# Patient Record
Sex: MALE | Race: BLACK OR AFRICAN AMERICAN | NOT HISPANIC OR LATINO | URBAN - METROPOLITAN AREA
[De-identification: names, ages, dates, MRNs, and addresses within clinical notes are randomized per-mention and may not be internally consistent; named-entity substitution may affect disease eponyms.]

---

## 2019-08-14 ENCOUNTER — EMERGENCY (EMERGENCY)
Facility: HOSPITAL | Age: 64
LOS: 1 days | End: 2019-08-14
Admitting: EMERGENCY MEDICINE
Payer: MEDICARE

## 2019-08-14 PROBLEM — Z00.00 ENCOUNTER FOR PREVENTIVE HEALTH EXAMINATION: Status: ACTIVE | Noted: 2019-08-14

## 2019-08-14 PROCEDURE — 99283 EMERGENCY DEPT VISIT LOW MDM: CPT

## 2019-08-16 ENCOUNTER — APPOINTMENT (OUTPATIENT)
Dept: RADIOLOGY | Facility: CLINIC | Age: 64
End: 2019-08-16
Payer: MEDICARE

## 2019-08-16 PROCEDURE — 72110 X-RAY EXAM L-2 SPINE 4/>VWS: CPT

## 2021-04-16 ENCOUNTER — APPOINTMENT (OUTPATIENT)
Age: 66
End: 2021-04-16
Payer: MEDICARE

## 2021-04-16 PROCEDURE — 0001A: CPT

## 2021-05-07 ENCOUNTER — APPOINTMENT (OUTPATIENT)
Age: 66
End: 2021-05-07
Payer: MEDICARE

## 2021-05-07 PROCEDURE — 0002A: CPT

## 2021-08-06 ENCOUNTER — RX RENEWAL (OUTPATIENT)
Age: 66
End: 2021-08-06

## 2021-08-13 ENCOUNTER — RX RENEWAL (OUTPATIENT)
Age: 66
End: 2021-08-13

## 2021-08-13 ENCOUNTER — APPOINTMENT (OUTPATIENT)
Dept: FAMILY MEDICINE | Facility: CLINIC | Age: 66
End: 2021-08-13
Payer: MEDICARE

## 2021-08-13 VITALS
BODY MASS INDEX: 44.38 KG/M2 | RESPIRATION RATE: 16 BRPM | HEART RATE: 78 BPM | HEIGHT: 63 IN | SYSTOLIC BLOOD PRESSURE: 140 MMHG | TEMPERATURE: 97.6 F | DIASTOLIC BLOOD PRESSURE: 80 MMHG | WEIGHT: 250.5 LBS | OXYGEN SATURATION: 99 %

## 2021-08-13 PROCEDURE — 99213 OFFICE O/P EST LOW 20 MIN: CPT | Mod: 25

## 2021-08-13 RX ORDER — SITAGLIPTIN 100 MG/1
100 TABLET, FILM COATED ORAL DAILY
Refills: 0 | Status: DISCONTINUED | COMMUNITY
End: 2021-08-13

## 2021-08-13 NOTE — HISTORY OF PRESENT ILLNESS
[FreeTextEntry1] : feeling well\par \par follows with endo.\par recently placed on trulicity\par \par presents for refill  and labs

## 2021-08-16 LAB
ALBUMIN SERPL ELPH-MCNC: 4.9 G/DL
ALP BLD-CCNC: 44 U/L
ALT SERPL-CCNC: 35 U/L
ANION GAP SERPL CALC-SCNC: 15 MMOL/L
AST SERPL-CCNC: 22 U/L
BILIRUB SERPL-MCNC: 0.4 MG/DL
BUN SERPL-MCNC: 27 MG/DL
CALCIUM SERPL-MCNC: 10.3 MG/DL
CHLORIDE SERPL-SCNC: 98 MMOL/L
CHOLEST SERPL-MCNC: 158 MG/DL
CO2 SERPL-SCNC: 20 MMOL/L
CREAT SERPL-MCNC: 0.89 MG/DL
ESTIMATED AVERAGE GLUCOSE: 174 MG/DL
GLUCOSE SERPL-MCNC: 240 MG/DL
HBA1C MFR BLD HPLC: 7.7 %
HDLC SERPL-MCNC: 54 MG/DL
LDLC SERPL CALC-MCNC: 90 MG/DL
NONHDLC SERPL-MCNC: 104 MG/DL
POTASSIUM SERPL-SCNC: 4.9 MMOL/L
PROT SERPL-MCNC: 7.7 G/DL
SODIUM SERPL-SCNC: 133 MMOL/L
TRIGL SERPL-MCNC: 71 MG/DL

## 2021-10-12 ENCOUNTER — RX RENEWAL (OUTPATIENT)
Age: 66
End: 2021-10-12

## 2021-11-04 ENCOUNTER — RX RENEWAL (OUTPATIENT)
Age: 66
End: 2021-11-04

## 2021-11-12 ENCOUNTER — APPOINTMENT (OUTPATIENT)
Dept: FAMILY MEDICINE | Facility: CLINIC | Age: 66
End: 2021-11-12

## 2021-11-19 ENCOUNTER — APPOINTMENT (OUTPATIENT)
Dept: FAMILY MEDICINE | Facility: CLINIC | Age: 66
End: 2021-11-19
Payer: MEDICARE

## 2021-11-19 VITALS
TEMPERATURE: 97.2 F | BODY MASS INDEX: 42.15 KG/M2 | HEIGHT: 65 IN | DIASTOLIC BLOOD PRESSURE: 80 MMHG | OXYGEN SATURATION: 99 % | HEART RATE: 83 BPM | WEIGHT: 253 LBS | SYSTOLIC BLOOD PRESSURE: 138 MMHG

## 2021-11-19 DIAGNOSIS — F10.11 ALCOHOL ABUSE, IN REMISSION: ICD-10-CM

## 2021-11-19 PROCEDURE — G0442 ANNUAL ALCOHOL SCREEN 15 MIN: CPT | Mod: 59

## 2021-11-19 PROCEDURE — 99214 OFFICE O/P EST MOD 30 MIN: CPT | Mod: 25

## 2021-11-19 PROCEDURE — G0443: CPT | Mod: 59

## 2021-11-19 NOTE — HISTORY OF PRESENT ILLNESS
[FreeTextEntry1] : Medication renewal and lab work [de-identified] : 66-year-old gentleman presents to this office for renewal of prescriptions as well as lab work\par He has a history of alcohol abuse and will resume drinking after the loss of his mother\par Complaining of right-sided groin spasm and pain down his right leg

## 2021-11-19 NOTE — PLAN
[FreeTextEntry1] : 66-year-old male presents for medication renewal\par Metabolic syndrome–hypertension hyperlipidemia diabetes mellitus type 2 on medication for control.\par Norvasc 10 mg daily/aspirin 81 mg daily/losartan 100 Sebastian/Metformin 2500 mg daily\par Metoprolol 50 mg every 12 hours\par Pioglitazone 30 mg daily\par Simvastatin 20 mg daily\par Spironolactone 25 mg daily\par Trial Trulicity 1.5 mg subcu pen injector\par Occasions are reviewed and renewed\par Blood pressure today 138/80\par History of alcohol abuse–self to find alcoholic who had stopped drinking, he returns to using alcohol since the death of his mother which is bothered him.\par He drinks greater than 4 drinks on a daily basis and is now resolved to stop\par He is now resolved to stop drinking alcohol screen and counseling are offered.\par Degenerative joint disease lumbar spine–methocarbamol 500 mg every 6 hours is reviewed and renewed\par 15 minutes of counseling and discussion are offered

## 2021-11-19 NOTE — HEALTH RISK ASSESSMENT
[Yes] : Yes [4 or more  times a week (4 pts)] : 4 or more  times a week (4 points) [7 to 9 (3 pts)] : 7 to 9 (3  points) [Weekly (3 pts)] : Weekly (3 points) [0] : 2) Feeling down, depressed, or hopeless: Not at all (0) [PHQ-2 Negative - No further assessment needed] : PHQ-2 Negative - No further assessment needed [Audit-CScore] : 10 [[id number]] : 0

## 2021-11-19 NOTE — COUNSELING
[AUDIT-C Screening administered and reviewed] : AUDIT-C Screening administered and reviewed [Hazards of at-risk alcohol use discussed] : Hazards of at-risk alcohol use discussed [Strategies to reduce or eliminate alcohol use discussed] : Strategies to reduce or eliminate alcohol use discussed [Quit Drinking] : Quit Drinking [FreeTextEntry1] : 15

## 2021-11-26 LAB
25(OH)D3 SERPL-MCNC: 46.4 NG/ML
ALBUMIN SERPL ELPH-MCNC: 4.8 G/DL
ALP BLD-CCNC: 48 U/L
ALT SERPL-CCNC: 43 U/L
ANION GAP SERPL CALC-SCNC: 19 MMOL/L
AST SERPL-CCNC: 32 U/L
BASOPHILS # BLD AUTO: 0.02 K/UL
BASOPHILS NFR BLD AUTO: 0.5 %
BILIRUB SERPL-MCNC: 0.3 MG/DL
BUN SERPL-MCNC: 20 MG/DL
CALCIUM SERPL-MCNC: 10.2 MG/DL
CHLORIDE SERPL-SCNC: 93 MMOL/L
CHOLEST SERPL-MCNC: 157 MG/DL
CO2 SERPL-SCNC: 18 MMOL/L
CREAT SERPL-MCNC: 0.82 MG/DL
EOSINOPHIL # BLD AUTO: 0.08 K/UL
EOSINOPHIL NFR BLD AUTO: 2 %
ESTIMATED AVERAGE GLUCOSE: 189 MG/DL
GLUCOSE SERPL-MCNC: 256 MG/DL
HBA1C MFR BLD HPLC: 8.2 %
HCT VFR BLD CALC: 36.2 %
HDLC SERPL-MCNC: 53 MG/DL
HGB BLD-MCNC: 11.6 G/DL
IMM GRANULOCYTES NFR BLD AUTO: 0.5 %
LDLC SERPL CALC-MCNC: 92 MG/DL
LYMPHOCYTES # BLD AUTO: 1.24 K/UL
LYMPHOCYTES NFR BLD AUTO: 30.7 %
MAN DIFF?: NORMAL
MCHC RBC-ENTMCNC: 30.2 PG
MCHC RBC-ENTMCNC: 32 GM/DL
MCV RBC AUTO: 94.3 FL
MONOCYTES # BLD AUTO: 0.49 K/UL
MONOCYTES NFR BLD AUTO: 12.1 %
NEUTROPHILS # BLD AUTO: 2.19 K/UL
NEUTROPHILS NFR BLD AUTO: 54.2 %
NONHDLC SERPL-MCNC: 104 MG/DL
PLATELET # BLD AUTO: 189 K/UL
POTASSIUM SERPL-SCNC: 4.8 MMOL/L
PROT SERPL-MCNC: 7.8 G/DL
PSA SERPL-MCNC: 11 NG/ML
RBC # BLD: 3.84 M/UL
RBC # FLD: 12 %
SODIUM SERPL-SCNC: 130 MMOL/L
TRIGL SERPL-MCNC: 60 MG/DL
TSH SERPL-ACNC: 1.21 UIU/ML
WBC # FLD AUTO: 4.04 K/UL

## 2021-12-03 ENCOUNTER — NON-APPOINTMENT (OUTPATIENT)
Age: 66
End: 2021-12-03

## 2022-01-26 ENCOUNTER — APPOINTMENT (OUTPATIENT)
Dept: FAMILY MEDICINE | Facility: CLINIC | Age: 67
End: 2022-01-26
Payer: MEDICARE

## 2022-01-26 VITALS
WEIGHT: 216 LBS | BODY MASS INDEX: 35.99 KG/M2 | RESPIRATION RATE: 14 BRPM | SYSTOLIC BLOOD PRESSURE: 140 MMHG | DIASTOLIC BLOOD PRESSURE: 80 MMHG | TEMPERATURE: 98 F | OXYGEN SATURATION: 98 % | HEIGHT: 65 IN | HEART RATE: 86 BPM

## 2022-01-26 PROCEDURE — 99214 OFFICE O/P EST MOD 30 MIN: CPT | Mod: 25

## 2022-01-26 NOTE — REVIEW OF SYSTEMS
[Negative] : Constitutional [Chest Pain] : no chest pain [Shortness Of Breath] : no shortness of breath [FreeTextEntry2] : except as stated in cc

## 2022-01-26 NOTE — HEALTH RISK ASSESSMENT
[0] : 2) Feeling down, depressed, or hopeless: Not at all (0) [PHQ-2 Negative - No further assessment needed] : PHQ-2 Negative - No further assessment needed [[id number]] : 0

## 2022-01-31 LAB
25(OH)D3 SERPL-MCNC: 50 NG/ML
ALBUMIN SERPL ELPH-MCNC: 4.7 G/DL
ALP BLD-CCNC: 44 U/L
ALT SERPL-CCNC: 30 U/L
ANION GAP SERPL CALC-SCNC: 17 MMOL/L
AST SERPL-CCNC: 19 U/L
BASOPHILS # BLD AUTO: 0.04 K/UL
BASOPHILS NFR BLD AUTO: 0.8 %
BILIRUB SERPL-MCNC: 0.4 MG/DL
BUN SERPL-MCNC: 20 MG/DL
CALCIUM SERPL-MCNC: 10.3 MG/DL
CHLORIDE SERPL-SCNC: 96 MMOL/L
CHOLEST SERPL-MCNC: 149 MG/DL
CO2 SERPL-SCNC: 20 MMOL/L
CREAT SERPL-MCNC: 0.93 MG/DL
EOSINOPHIL # BLD AUTO: 0.09 K/UL
EOSINOPHIL NFR BLD AUTO: 1.8 %
ESTIMATED AVERAGE GLUCOSE: 177 MG/DL
GLUCOSE SERPL-MCNC: 213 MG/DL
HBA1C MFR BLD HPLC: 7.8 %
HCT VFR BLD CALC: 37.8 %
HDLC SERPL-MCNC: 52 MG/DL
HGB BLD-MCNC: 11.9 G/DL
IMM GRANULOCYTES NFR BLD AUTO: 0.4 %
LDLC SERPL CALC-MCNC: 83 MG/DL
LYMPHOCYTES # BLD AUTO: 1.51 K/UL
LYMPHOCYTES NFR BLD AUTO: 30.8 %
MAN DIFF?: NORMAL
MCHC RBC-ENTMCNC: 30.4 PG
MCHC RBC-ENTMCNC: 31.5 GM/DL
MCV RBC AUTO: 96.7 FL
MONOCYTES # BLD AUTO: 0.7 K/UL
MONOCYTES NFR BLD AUTO: 14.3 %
NEUTROPHILS # BLD AUTO: 2.55 K/UL
NEUTROPHILS NFR BLD AUTO: 51.9 %
NONHDLC SERPL-MCNC: 97 MG/DL
PLATELET # BLD AUTO: 220 K/UL
POTASSIUM SERPL-SCNC: 4.8 MMOL/L
PROT SERPL-MCNC: 7.7 G/DL
RBC # BLD: 3.91 M/UL
RBC # FLD: 12.6 %
SARS-COV-2 N GENE NPH QL NAA+PROBE: NOT DETECTED
SODIUM SERPL-SCNC: 133 MMOL/L
TRIGL SERPL-MCNC: 69 MG/DL
WBC # FLD AUTO: 4.91 K/UL

## 2022-02-22 ENCOUNTER — RX RENEWAL (OUTPATIENT)
Age: 67
End: 2022-02-22

## 2022-03-24 ENCOUNTER — NON-APPOINTMENT (OUTPATIENT)
Age: 67
End: 2022-03-24

## 2022-03-24 ENCOUNTER — APPOINTMENT (OUTPATIENT)
Dept: FAMILY MEDICINE | Facility: CLINIC | Age: 67
End: 2022-03-24
Payer: MEDICARE

## 2022-03-24 VITALS
TEMPERATURE: 97.1 F | HEIGHT: 65 IN | SYSTOLIC BLOOD PRESSURE: 130 MMHG | OXYGEN SATURATION: 98 % | BODY MASS INDEX: 43.15 KG/M2 | WEIGHT: 259 LBS | DIASTOLIC BLOOD PRESSURE: 82 MMHG | HEART RATE: 81 BPM

## 2022-03-24 DIAGNOSIS — Z01.818 ENCOUNTER FOR OTHER PREPROCEDURAL EXAMINATION: ICD-10-CM

## 2022-03-24 PROCEDURE — 99214 OFFICE O/P EST MOD 30 MIN: CPT

## 2022-03-25 NOTE — HISTORY OF PRESENT ILLNESS
[No Pertinent Cardiac History] : no history of aortic stenosis, atrial fibrillation, coronary artery disease, recent myocardial infarction, or implantable device/pacemaker [No Pertinent Pulmonary History] : no history of asthma, COPD, sleep apnea, or smoking [No Adverse Anesthesia Reaction] : no adverse anesthesia reaction in self or family member [Diabetes] : diabetes [(Patient denies any chest pain, claudication, dyspnea on exertion, orthopnea, palpitations or syncope)] : Patient denies any chest pain, claudication, dyspnea on exertion, orthopnea, palpitations or syncope [FreeTextEntry1] : prostate biopsy [FreeTextEntry2] : 03/28/2022

## 2022-05-02 ENCOUNTER — RX RENEWAL (OUTPATIENT)
Age: 67
End: 2022-05-02

## 2022-05-06 ENCOUNTER — OUTPATIENT (OUTPATIENT)
Dept: OUTPATIENT SERVICES | Facility: HOSPITAL | Age: 67
LOS: 1 days | End: 2022-05-06

## 2022-05-06 DIAGNOSIS — C61 MALIGNANT NEOPLASM OF PROSTATE: ICD-10-CM

## 2022-05-09 ENCOUNTER — APPOINTMENT (OUTPATIENT)
Dept: HEMATOLOGY ONCOLOGY | Facility: CLINIC | Age: 67
End: 2022-05-09
Payer: MEDICARE

## 2022-05-09 VITALS
SYSTOLIC BLOOD PRESSURE: 142 MMHG | HEIGHT: 65 IN | HEART RATE: 80 BPM | TEMPERATURE: 97.9 F | WEIGHT: 257.2 LBS | BODY MASS INDEX: 42.85 KG/M2 | DIASTOLIC BLOOD PRESSURE: 82 MMHG | OXYGEN SATURATION: 98 %

## 2022-05-09 PROCEDURE — 99205 OFFICE O/P NEW HI 60 MIN: CPT

## 2022-05-11 NOTE — PHYSICAL EXAM
[Fully active, able to carry on all pre-disease performance without restriction] : Status 0 - Fully active, able to carry on all pre-disease performance without restriction [Normal] : affect appropriate [de-identified] : generally well appearing male, NAD, pleasant

## 2022-05-11 NOTE — CONSULT LETTER
[Dear  ___] : Dear  [unfilled], [Consult Letter:] : I had the pleasure of evaluating your patient, [unfilled]. [Please see my note below.] : Please see my note below. [Consult Closing:] : Thank you very much for allowing me to participate in the care of this patient.  If you have any questions, please do not hesitate to contact me. [Sincerely,] : Sincerely, [FreeTextEntry2] : Dr. Linda Caballero  [FreeTextEntry3] : Dr. Matilda Small\par  [DrKylie  ___] : Dr. RIDER

## 2022-05-11 NOTE — RESULTS/DATA
[FreeTextEntry1] : Mr. Washington presented at age 66 in May 2022 for evaluation of locally recurrent prostate adenocarcinoma. \par The patient has a medical history of HTN, DM.\par \par He is pending salvage RT for locally recurrent prostate adenocarcinoma with Dr. Caballero. \par Initiated on bicalutamide and lupron. \par \par We discussed initiation of abiraterone (Zytiga) 1000mg daily + prednisone 5mg daily following radiation therapy. Patient informed that they should not eat for 2 hours before or 1 hour after taking abiraterone. Additional side effects of abiraterone were discussed including peripheral edema, hypertension, hyperglycemia, elevated liver enzymes, arthralgias and hypokalemia. We will discuss this further when he is close to finishing radiation therapy. \par

## 2022-05-11 NOTE — HISTORY OF PRESENT ILLNESS
[de-identified] : Referred by: Dr. Linda Caballero\par Diagnosis: Locally recurrent prostate cancer\par \par Mr. Washington presented at age 66 in May 2022 for evaluation of locally recurrent prostate adenocarcinoma. \par The patient has a medical history of HTN, DM.\par \par Gildardo is referred for consultation for locally recurrent prostate cancer. He has a history of prostate cancer and initially underwent radical prostatectomy by his urologist about 15 years ago, denies any adjuvant therapy at that time.  He was subsequently lost to follow-up and recent PSA testing by his primary care physician showed an elevated PSA to 11. He underwent an MRI of the prostate which showed likely prostate bed recurrence of the prostatectomy bed.  There was a visible abnormality measuring 21 x 15 mm that involve the bladder neck and encases the proximal urethra.  He underwent biopsy of the lesion which confirmed [place] 4+3 carcinoma. PSMA PET CT was performed on 4/28/22 which initially was read as negative however upon further review showed focal PSMA uptake in the prostate bed as well as bilateral breast uptake likely correlating with gynecomastia.  He has now been initiated on bicalutamide and received his first lupron injection by urologist Dr. Olguin on Thursday 5/5/22. He is pending salvage radiation treatment with Dr. Caballero and has been referred to medical oncology for consideration of additional systemic therapies.\par \par At today's visit he reports that he is feeling generally very well.  He denies any urinary complaints at this time.  He has a good appetite and his weight is stable.  He also reports a good energy level.  He is here today with 2 of his sisters who he is very close with.\par \par PSA 4/8/22: 11.32\par PSA 11/19/21: 11 \par WBC 4.68, Hb 10.5, Plt 172 \par \par Imaging:\par PSMA PET CT 4/28/22: There is subtle focal PSMA uptake in the prostate surgical resection site in the right posterior lateral region adjacent to the ureter with an SUV max 11.6, 1.1 x 0.8 cm.  It corresponds to the 2.1 x 1.5 cm T2 hypointense lesion described on most recent prostate MRI.  It is adjacent to the distended urinary bladder making it difficult to distinguish from the overlying PSMA avid urine.  No evidence of PSMA avid adenopathy in the pelvis abdomen or above the diaphragm to suggest edinson mets.  There is some focal increased PSMA uptake in the left breast greater than right breast.  No evidence of PSMA avid metastatic disease in the bones.\par \par MRI prostate 3/3/22 - 2.1 x 1.5cm PI-RADS 5 lesion wrapping around the proximal urethra, anterior R lateral posterior with some tumor extending posterior superiorly behind the bladder neck and R seminal vesicle\par \par Path: \par Prostate lesion biopsy 3/28/22: Adenocarcinoma with extension into the muscle, laura 4+3=7, grade group 3, 80% involvement, measuring 12mm, PNI+\par \par Genetics: not done \par \par HCM: \par - COVID vaccination: s/p 2 doses \par - Colonoscopy: never done \par \par SH: \par - Occupation: on disability \par - Living situation: lives in [place], does not have any children, he has a strong family support system with several sisters  \par - Smoking/etoh/illicits: never smoker, drinks at holidays, denies ilicits \par - Exercise: minimally active \par \par FH: \par - His sister had breast cancer around age 60

## 2022-05-26 ENCOUNTER — RX RENEWAL (OUTPATIENT)
Age: 67
End: 2022-05-26

## 2022-06-03 ENCOUNTER — LABORATORY RESULT (OUTPATIENT)
Age: 67
End: 2022-06-03

## 2022-06-03 ENCOUNTER — APPOINTMENT (OUTPATIENT)
Dept: FAMILY MEDICINE | Facility: CLINIC | Age: 67
End: 2022-06-03
Payer: MEDICARE

## 2022-06-03 VITALS
TEMPERATURE: 97.4 F | OXYGEN SATURATION: 99 % | HEIGHT: 65 IN | DIASTOLIC BLOOD PRESSURE: 74 MMHG | RESPIRATION RATE: 14 BRPM | WEIGHT: 255 LBS | BODY MASS INDEX: 42.49 KG/M2 | HEART RATE: 94 BPM | SYSTOLIC BLOOD PRESSURE: 130 MMHG

## 2022-06-03 DIAGNOSIS — Z13.29 ENCOUNTER FOR SCREENING FOR OTHER SUSPECTED ENDOCRINE DISORDER: ICD-10-CM

## 2022-06-03 PROCEDURE — 36415 COLL VENOUS BLD VENIPUNCTURE: CPT

## 2022-06-03 PROCEDURE — 99215 OFFICE O/P EST HI 40 MIN: CPT | Mod: 25

## 2022-06-03 RX ORDER — METFORMIN HYDROCHLORIDE 500 MG/1
500 TABLET, COATED ORAL DAILY
Qty: 90 | Refills: 0 | Status: DISCONTINUED | COMMUNITY
End: 2022-06-03

## 2022-06-03 RX ORDER — DULAGLUTIDE 1.5 MG/.5ML
1.5 INJECTION, SOLUTION SUBCUTANEOUS
Qty: 2 | Refills: 0 | Status: DISCONTINUED | COMMUNITY
End: 2022-06-03

## 2022-06-03 RX ORDER — PIOGLITAZONE HYDROCHLORIDE 30 MG/1
30 TABLET ORAL DAILY
Qty: 90 | Refills: 0 | Status: DISCONTINUED | COMMUNITY
End: 2022-06-03

## 2022-06-03 NOTE — HISTORY OF PRESENT ILLNESS
[FreeTextEntry1] : med refills [de-identified] : 66M presents for medication refills and a new diangosis of prostate cancer. Pt has a history of HTN, hyperlipidemia and DM2. He was seeing an endocrinologist, but received a letter from her office saying it was closing down. Patient gave us paperwork to obtain records from endocrinologist. patient states he is doing okay despite the news of prostate cancer. he starts radiation next week, currently will not need chemotherapy. He also suffered a back injury a few weeks ago. He was using muscle relaxers and pain medication, but at this point in time he is only using bengay. He denies acute complaints at todays visit. States his diet has not been optimal, related to the stress of his new diagnosis. He has a history of alcohol abuse, but is not currently drinking.

## 2022-06-03 NOTE — HEALTH RISK ASSESSMENT
[No] : No [No falls in past year] : Patient reported no falls in the past year [0] : 2) Feeling down, depressed, or hopeless: Not at all (0) [PHQ-2 Negative - No further assessment needed] : PHQ-2 Negative - No further assessment needed [[id number]] : 0

## 2022-06-03 NOTE — PLAN
[FreeTextEntry1] : DM2- Patient is taking actos 15mg, trulicity 3mg weekly and metformin 1000mg BID. he denies needing medication refills at this time.He was previously seeing endo for this, but the providers practice closed suddenly. requested records from previous practice. A1C ordered\par \par HTN- BP in office 130/74. medications are effective. taking amlodipine 10mg, losartan 100mg, and metoprolol 50mg CMP ordered\par \par Hyperlipidemia- taking simvastatin 20mg, compliant with medications at this time. lasting lipids ordered\par \par Low back pain- using bengay, denies other medications. Educated on need for lower back exercises and stretches.\par \par Prostate cancer- starts radiation next week. \par \par Vitamin D- ergocalciferol 1.25mg once weekly prescribed, vitamin d level ordered. \par Routine TSH ordered.\par \par

## 2022-06-08 LAB
24R-OH-CALCIDIOL SERPL-MCNC: 45.4 PG/ML
ALBUMIN SERPL ELPH-MCNC: 4.9 G/DL
ALP BLD-CCNC: 51 U/L
ALT SERPL-CCNC: 33 U/L
ANION GAP SERPL CALC-SCNC: 16 MMOL/L
AST SERPL-CCNC: 21 U/L
BASOPHILS # BLD AUTO: 0.03 K/UL
BASOPHILS NFR BLD AUTO: 0.7 %
BILIRUB SERPL-MCNC: 0.3 MG/DL
BUN SERPL-MCNC: 17 MG/DL
CALCIUM SERPL-MCNC: 10 MG/DL
CHLORIDE SERPL-SCNC: 96 MMOL/L
CHOLEST SERPL-MCNC: 176 MG/DL
CO2 SERPL-SCNC: 19 MMOL/L
CREAT SERPL-MCNC: 0.85 MG/DL
EGFR: 97 ML/MIN/1.73M2
EOSINOPHIL # BLD AUTO: 0.07 K/UL
EOSINOPHIL NFR BLD AUTO: 1.7 %
ESTIMATED AVERAGE GLUCOSE: 226 MG/DL
GLUCOSE SERPL-MCNC: 334 MG/DL
HBA1C MFR BLD HPLC: 9.5 %
HCT VFR BLD CALC: 40.2 %
HDLC SERPL-MCNC: 53 MG/DL
HGB BLD-MCNC: 11.6 G/DL
IMM GRANULOCYTES NFR BLD AUTO: 0 %
LDLC SERPL CALC-MCNC: 108 MG/DL
LYMPHOCYTES # BLD AUTO: 1.09 K/UL
LYMPHOCYTES NFR BLD AUTO: 26.8 %
MAN DIFF?: NORMAL
MCHC RBC-ENTMCNC: 28.9 GM/DL
MCHC RBC-ENTMCNC: 30.3 PG
MCV RBC AUTO: 105 FL
MONOCYTES # BLD AUTO: 0.5 K/UL
MONOCYTES NFR BLD AUTO: 12.3 %
NEUTROPHILS # BLD AUTO: 2.37 K/UL
NEUTROPHILS NFR BLD AUTO: 58.5 %
NONHDLC SERPL-MCNC: 122 MG/DL
PLATELET # BLD AUTO: 158 K/UL
POTASSIUM SERPL-SCNC: 4.6 MMOL/L
PROT SERPL-MCNC: 8 G/DL
RBC # BLD: 3.83 M/UL
RBC # FLD: 12.5 %
SODIUM SERPL-SCNC: 131 MMOL/L
TRIGL SERPL-MCNC: 69 MG/DL
TSH SERPL-ACNC: 1.33 UIU/ML
WBC # FLD AUTO: 4.06 K/UL

## 2022-07-06 ENCOUNTER — OUTPATIENT (OUTPATIENT)
Dept: OUTPATIENT SERVICES | Facility: HOSPITAL | Age: 67
LOS: 1 days | End: 2022-07-06
Payer: MEDICARE

## 2022-07-06 DIAGNOSIS — C61 MALIGNANT NEOPLASM OF PROSTATE: ICD-10-CM

## 2022-07-07 ENCOUNTER — LABORATORY RESULT (OUTPATIENT)
Age: 67
End: 2022-07-07

## 2022-07-07 ENCOUNTER — APPOINTMENT (OUTPATIENT)
Dept: HEMATOLOGY ONCOLOGY | Facility: CLINIC | Age: 67
End: 2022-07-07

## 2022-07-07 VITALS
HEART RATE: 87 BPM | DIASTOLIC BLOOD PRESSURE: 98 MMHG | SYSTOLIC BLOOD PRESSURE: 177 MMHG | TEMPERATURE: 97.9 F | OXYGEN SATURATION: 98 % | BODY MASS INDEX: 43.47 KG/M2 | WEIGHT: 261.2 LBS

## 2022-07-07 PROCEDURE — 99214 OFFICE O/P EST MOD 30 MIN: CPT

## 2022-07-07 NOTE — HISTORY OF PRESENT ILLNESS
[de-identified] : Referred by: Dr. Linda Caballero\par Diagnosis: Locally recurrent prostate cancer\par \par Mr. Washington presented at age 66 in May 2022 for evaluation of locally recurrent prostate adenocarcinoma. \par The patient has a medical history of HTN, DM.\par \par Presenting HPI: Gildardo is referred for consultation for locally recurrent prostate cancer. He has a history of prostate cancer and initially underwent radical prostatectomy by his urologist about 15 years ago, denies any adjuvant therapy at that time.  He was subsequently lost to follow-up and recent PSA testing by his primary care physician showed an elevated PSA to 11. He underwent an MRI of the prostate which showed likely prostate bed recurrence of the prostatectomy bed.  There was a visible abnormality measuring 21 x 15 mm that involve the bladder neck and encases the proximal urethra.  He underwent biopsy of the lesion which confirmed [place] 4+3 carcinoma. PSMA PET CT was performed on 4/28/22 which initially was read as negative however upon further review showed focal PSMA uptake in the prostate bed as well as bilateral breast uptake likely correlating with gynecomastia.  He has now been initiated on bicalutamide and received his first lupron injection by urologist Dr. Olguin on Thursday 5/5/22. He is pending salvage radiation treatment with Dr. Caballero and has been referred to medical oncology for consideration of additional systemic therapies.\par \par At today's visit he reports that he is feeling generally very well.  He denies any urinary complaints at this time.  He has a good appetite and his weight is stable.  He also reports a good energy level.  He is here today with 2 of his sisters who he is very close with.\par \par PSA 4/8/22: 11.32\par PSA 11/19/21: 11 \par WBC 4.68, Hb 10.5, Plt 172 \par \par Imaging:\par PSMA PET CT 4/28/22: There is subtle focal PSMA uptake in the prostate surgical resection site in the right posterior lateral region adjacent to the ureter with an SUV max 11.6, 1.1 x 0.8 cm.  It corresponds to the 2.1 x 1.5 cm T2 hypointense lesion described on most recent prostate MRI.  It is adjacent to the distended urinary bladder making it difficult to distinguish from the overlying PSMA avid urine.  No evidence of PSMA avid adenopathy in the pelvis abdomen or above the diaphragm to suggest edinson mets.  There is some focal increased PSMA uptake in the left breast greater than right breast.  No evidence of PSMA avid metastatic disease in the bones.\par \par MRI prostate 3/3/22 - 2.1 x 1.5cm PI-RADS 5 lesion wrapping around the proximal urethra, anterior R lateral posterior with some tumor extending posterior superiorly behind the bladder neck and R seminal vesicle\par \par Path: \par Prostate lesion biopsy 3/28/22: Adenocarcinoma with extension into the muscle, laura 4+3=7, grade group 3, 80% involvement, measuring 12mm, PNI+\par \par Genetics: not done \par \par HCM: \par - COVID vaccination: s/p 2 doses \par - Colonoscopy: never done \par \par SH: \par - Occupation: on disability \par - Living situation: lives in [place], does not have any children, he has a strong family support system with several sisters  \par - Smoking/etoh/illicits: never smoker, drinks at holidays, denies ilicits \par - Exercise: minimally active \par \par FH: \par - His sister had breast cancer around age 60 [de-identified] : Gildardo presents for follow up on 7/7/22 for prostate cancer. \par Lupron due August 2022- Dr. Olguin \par \par At today's visit he is doing well. He is undergoing radiation with Dr. Caballero which started 3 weeks ago. He is tolerating RT well thus far. He wakes 4 times per night to urinate which is stable. He remains on bicalutamide once daily. He denies fevers, chills, CP, SOB, n/v/d.

## 2022-07-07 NOTE — RESULTS/DATA
[FreeTextEntry1] : Mr. Washington presented at age 66 in May 2022 for evaluation of locally recurrent prostate adenocarcinoma. \par The patient has a medical history of HTN, DM.\par \par Undergoing salvage RT for locally recurrent prostate adenocarcinoma with Dr. Caballero. \par Initiated on bicalutamide and lupron. \par \par We discussed initiation of abiraterone (Zytiga) 1000mg daily + prednisone 5mg daily following radiation therapy. Patient informed that they should not eat for 2 hours before or 1 hour after taking abiraterone. Additional side effects of abiraterone were discussed including peripheral edema, hypertension, hyperglycemia, elevated liver enzymes, arthralgias and hypokalemia. We will discuss this further when he has completed RT. \par

## 2022-07-07 NOTE — PHYSICAL EXAM
[Fully active, able to carry on all pre-disease performance without restriction] : Status 0 - Fully active, able to carry on all pre-disease performance without restriction [Normal] : affect appropriate [de-identified] : generally well appearing male, NAD, pleasant

## 2022-07-07 NOTE — CONSULT LETTER
[Dear  ___] : Dear  [unfilled], [Consult Letter:] : I had the pleasure of evaluating your patient, [unfilled]. [Please see my note below.] : Please see my note below. [Consult Closing:] : Thank you very much for allowing me to participate in the care of this patient.  If you have any questions, please do not hesitate to contact me. [Sincerely,] : Sincerely, [FreeTextEntry2] : Dr. Linda Caballero [FreeTextEntry3] : Dr. Matilda Small\par

## 2022-07-08 ENCOUNTER — NON-APPOINTMENT (OUTPATIENT)
Age: 67
End: 2022-07-08

## 2022-07-18 ENCOUNTER — RESULT REVIEW (OUTPATIENT)
Age: 67
End: 2022-07-18

## 2022-07-18 ENCOUNTER — APPOINTMENT (OUTPATIENT)
Dept: HEMATOLOGY ONCOLOGY | Facility: CLINIC | Age: 67
End: 2022-07-18

## 2022-07-18 LAB
BASOPHILS # BLD AUTO: 0.02 K/UL — SIGNIFICANT CHANGE UP (ref 0–0.2)
BASOPHILS NFR BLD AUTO: 1 % — SIGNIFICANT CHANGE UP (ref 0–2)
EOSINOPHIL # BLD AUTO: 0 K/UL — SIGNIFICANT CHANGE UP (ref 0–0.5)
EOSINOPHIL NFR BLD AUTO: 0 % — SIGNIFICANT CHANGE UP (ref 0–6)
HCT VFR BLD CALC: 30.5 % — LOW (ref 39–50)
HGB BLD-MCNC: 9.9 G/DL — LOW (ref 13–17)
LG PLATELETS BLD QL AUTO: SLIGHT — SIGNIFICANT CHANGE UP
LYMPHOCYTES # BLD AUTO: 0.43 K/UL — LOW (ref 1–3.3)
LYMPHOCYTES # BLD AUTO: 18 % — SIGNIFICANT CHANGE UP (ref 13–44)
MCHC RBC-ENTMCNC: 30.4 PG — SIGNIFICANT CHANGE UP (ref 27–34)
MCHC RBC-ENTMCNC: 32.5 GM/DL — SIGNIFICANT CHANGE UP (ref 32–36)
MCV RBC AUTO: 93.6 FL — SIGNIFICANT CHANGE UP (ref 80–100)
MONOCYTES # BLD AUTO: 0.29 K/UL — SIGNIFICANT CHANGE UP (ref 0–0.9)
MONOCYTES NFR BLD AUTO: 12 % — SIGNIFICANT CHANGE UP (ref 2–14)
NEUTROPHILS # BLD AUTO: 1.64 K/UL — LOW (ref 1.8–7.4)
NEUTROPHILS NFR BLD AUTO: 69 % — SIGNIFICANT CHANGE UP (ref 43–77)
NRBC # BLD: 0 /100 — SIGNIFICANT CHANGE UP (ref 0–0)
NRBC # BLD: SIGNIFICANT CHANGE UP /100 WBCS (ref 0–0)
PLAT MORPH BLD: NORMAL — SIGNIFICANT CHANGE UP
PLATELET # BLD AUTO: 134 K/UL — LOW (ref 150–400)
RBC # BLD: 3.26 M/UL — LOW (ref 4.2–5.8)
RBC # FLD: 12.9 % — SIGNIFICANT CHANGE UP (ref 10.3–14.5)
RBC BLD AUTO: NORMAL — SIGNIFICANT CHANGE UP
WBC # BLD: 2.38 K/UL — LOW (ref 3.8–10.5)
WBC # FLD AUTO: 2.38 K/UL — LOW (ref 3.8–10.5)

## 2022-07-18 PROCEDURE — 36415 COLL VENOUS BLD VENIPUNCTURE: CPT

## 2022-07-18 PROCEDURE — 85027 COMPLETE CBC AUTOMATED: CPT

## 2022-07-19 ENCOUNTER — NON-APPOINTMENT (OUTPATIENT)
Age: 67
End: 2022-07-19

## 2022-07-20 ENCOUNTER — RX RENEWAL (OUTPATIENT)
Age: 67
End: 2022-07-20

## 2022-07-20 ENCOUNTER — NON-APPOINTMENT (OUTPATIENT)
Age: 67
End: 2022-07-20

## 2022-07-28 ENCOUNTER — RX RENEWAL (OUTPATIENT)
Age: 67
End: 2022-07-28

## 2022-08-01 ENCOUNTER — APPOINTMENT (OUTPATIENT)
Dept: HEMATOLOGY ONCOLOGY | Facility: CLINIC | Age: 67
End: 2022-08-01

## 2022-08-02 LAB
APPEARANCE: CLEAR
BILIRUBIN URINE: NEGATIVE
BLOOD URINE: NEGATIVE
COLOR: NORMAL
GLUCOSE QUALITATIVE U: ABNORMAL
KETONES URINE: NEGATIVE
LEUKOCYTE ESTERASE URINE: NEGATIVE
NITRITE URINE: NEGATIVE
PH URINE: 6.5
PROTEIN URINE: NEGATIVE
SPECIFIC GRAVITY URINE: 1.02
UROBILINOGEN URINE: NORMAL

## 2022-08-03 ENCOUNTER — RESULT REVIEW (OUTPATIENT)
Age: 67
End: 2022-08-03

## 2022-08-12 ENCOUNTER — NON-APPOINTMENT (OUTPATIENT)
Age: 67
End: 2022-08-12

## 2022-08-13 ENCOUNTER — OUTPATIENT (OUTPATIENT)
Dept: OUTPATIENT SERVICES | Facility: HOSPITAL | Age: 67
LOS: 1 days | End: 2022-08-13

## 2022-08-13 DIAGNOSIS — C61 MALIGNANT NEOPLASM OF PROSTATE: ICD-10-CM

## 2022-08-15 ENCOUNTER — APPOINTMENT (OUTPATIENT)
Dept: HEMATOLOGY ONCOLOGY | Facility: CLINIC | Age: 67
End: 2022-08-15

## 2022-08-16 LAB
APPEARANCE: CLEAR
BACTERIA UR CULT: NORMAL
BILIRUBIN URINE: NEGATIVE
BLOOD URINE: NEGATIVE
COLOR: YELLOW
GLUCOSE QUALITATIVE U: ABNORMAL
KETONES URINE: NEGATIVE
LEUKOCYTE ESTERASE URINE: NEGATIVE
NITRITE URINE: NEGATIVE
PH URINE: 5.5
PROTEIN URINE: NORMAL
SPECIFIC GRAVITY URINE: 1.03
UROBILINOGEN URINE: NORMAL

## 2022-08-19 ENCOUNTER — OUTPATIENT (OUTPATIENT)
Dept: OUTPATIENT SERVICES | Facility: HOSPITAL | Age: 67
LOS: 1 days | End: 2022-08-19
Payer: MEDICARE

## 2022-08-19 ENCOUNTER — NON-APPOINTMENT (OUTPATIENT)
Age: 67
End: 2022-08-19

## 2022-08-19 DIAGNOSIS — C61 MALIGNANT NEOPLASM OF PROSTATE: ICD-10-CM

## 2022-08-22 ENCOUNTER — RESULT REVIEW (OUTPATIENT)
Age: 67
End: 2022-08-22

## 2022-08-22 ENCOUNTER — APPOINTMENT (OUTPATIENT)
Dept: HEMATOLOGY ONCOLOGY | Facility: CLINIC | Age: 67
End: 2022-08-22

## 2022-08-22 VITALS
HEART RATE: 90 BPM | OXYGEN SATURATION: 98 % | WEIGHT: 258 LBS | TEMPERATURE: 97.4 F | BODY MASS INDEX: 42.99 KG/M2 | DIASTOLIC BLOOD PRESSURE: 82 MMHG | HEIGHT: 65 IN | SYSTOLIC BLOOD PRESSURE: 135 MMHG

## 2022-08-22 LAB
BASOPHILS # BLD AUTO: 0.02 K/UL — SIGNIFICANT CHANGE UP (ref 0–0.2)
BASOPHILS NFR BLD AUTO: 0.6 % — SIGNIFICANT CHANGE UP (ref 0–2)
EOSINOPHIL # BLD AUTO: 0.05 K/UL — SIGNIFICANT CHANGE UP (ref 0–0.5)
EOSINOPHIL NFR BLD AUTO: 1.4 % — SIGNIFICANT CHANGE UP (ref 0–6)
HCT VFR BLD CALC: 26.6 % — LOW (ref 39–50)
HGB BLD-MCNC: 8.5 G/DL — LOW (ref 13–17)
IMM GRANULOCYTES NFR BLD AUTO: 0.8 % — SIGNIFICANT CHANGE UP (ref 0–1.5)
LYMPHOCYTES # BLD AUTO: 0.64 K/UL — LOW (ref 1–3.3)
LYMPHOCYTES # BLD AUTO: 17.8 % — SIGNIFICANT CHANGE UP (ref 13–44)
MCHC RBC-ENTMCNC: 31.7 PG — SIGNIFICANT CHANGE UP (ref 27–34)
MCHC RBC-ENTMCNC: 32 GM/DL — SIGNIFICANT CHANGE UP (ref 32–36)
MCV RBC AUTO: 99.3 FL — SIGNIFICANT CHANGE UP (ref 80–100)
MONOCYTES # BLD AUTO: 0.5 K/UL — SIGNIFICANT CHANGE UP (ref 0–0.9)
MONOCYTES NFR BLD AUTO: 13.9 % — SIGNIFICANT CHANGE UP (ref 2–14)
NEUTROPHILS # BLD AUTO: 2.35 K/UL — SIGNIFICANT CHANGE UP (ref 1.8–7.4)
NEUTROPHILS NFR BLD AUTO: 65.5 % — SIGNIFICANT CHANGE UP (ref 43–77)
NRBC # BLD: 0 /100 WBCS — SIGNIFICANT CHANGE UP (ref 0–0)
PLATELET # BLD AUTO: 208 K/UL — SIGNIFICANT CHANGE UP (ref 150–400)
RBC # BLD: 2.68 M/UL — LOW (ref 4.2–5.8)
RBC # FLD: 13.5 % — SIGNIFICANT CHANGE UP (ref 10.3–14.5)
WBC # BLD: 3.59 K/UL — LOW (ref 3.8–10.5)
WBC # FLD AUTO: 3.59 K/UL — LOW (ref 3.8–10.5)

## 2022-08-22 PROCEDURE — 99214 OFFICE O/P EST MOD 30 MIN: CPT

## 2022-08-22 PROCEDURE — 85027 COMPLETE CBC AUTOMATED: CPT

## 2022-08-22 NOTE — HISTORY OF PRESENT ILLNESS
[de-identified] : Referred by: Dr. Linda Caballero\par Diagnosis: Locally recurrent prostate cancer\par \par Mr. Washington presented at age 66 in May 2022 for evaluation of locally recurrent prostate adenocarcinoma. \par The patient has a medical history of HTN, DM.\par \par Presenting HPI: Gildardo is referred for consultation for locally recurrent prostate cancer. He has a history of prostate cancer and initially underwent radical prostatectomy by his urologist about 15 years ago, denies any adjuvant therapy at that time.  He was subsequently lost to follow-up and recent PSA testing by his primary care physician showed an elevated PSA to 11. He underwent an MRI of the prostate which showed likely prostate bed recurrence of the prostatectomy bed.  There was a visible abnormality measuring 21 x 15 mm that involve the bladder neck and encases the proximal urethra.  He underwent biopsy of the lesion which confirmed [place] 4+3 carcinoma. PSMA PET CT was performed on 4/28/22 which initially was read as negative however upon further review showed focal PSMA uptake in the prostate bed as well as bilateral breast uptake likely correlating with gynecomastia.  He has now been initiated on bicalutamide and received his first lupron injection by urologist Dr. Olguin on Thursday 5/5/22. He is pending salvage radiation treatment with Dr. Caballero and has been referred to medical oncology for consideration of additional systemic therapies.\par \par At today's visit he reports that he is feeling generally very well.  He denies any urinary complaints at this time.  He has a good appetite and his weight is stable.  He also reports a good energy level.  He is here today with 2 of his sisters who he is very close with.\par \par PSA 4/8/22: 11.32\par PSA 11/19/21: 11 \par WBC 4.68, Hb 10.5, Plt 172 \par \par Imaging:\par PSMA PET CT 4/28/22: There is subtle focal PSMA uptake in the prostate surgical resection site in the right posterior lateral region adjacent to the ureter with an SUV max 11.6, 1.1 x 0.8 cm.  It corresponds to the 2.1 x 1.5 cm T2 hypointense lesion described on most recent prostate MRI.  It is adjacent to the distended urinary bladder making it difficult to distinguish from the overlying PSMA avid urine.  No evidence of PSMA avid adenopathy in the pelvis abdomen or above the diaphragm to suggest edinson mets.  There is some focal increased PSMA uptake in the left breast greater than right breast.  No evidence of PSMA avid metastatic disease in the bones.\par \par MRI prostate 3/3/22 - 2.1 x 1.5cm PI-RADS 5 lesion wrapping around the proximal urethra, anterior R lateral posterior with some tumor extending posterior superiorly behind the bladder neck and R seminal vesicle\par \par Path: \par Prostate lesion biopsy 3/28/22: Adenocarcinoma with extension into the muscle, laura 4+3=7, grade group 3, 80% involvement, measuring 12mm, PNI+\par \par Genetics: not done \par \par HCM: \par - COVID vaccination: s/p 2 doses \par - Colonoscopy: never done \par \par SH: \par - Occupation: on disability \par - Living situation: lives in [place], does not have any children, he has a strong family support system with several sisters  \par - Smoking/etoh/illicits: never smoker, drinks at holidays, denies ilicits \par - Exercise: minimally active \par \par FH: \par - His sister had breast cancer around age 60 [de-identified] : Gildardo presents for follow up on 8/22/22 for prostate cancer. \par Lupron due August 2022- Dr. Olguin \par \par At today's visit the patient states he has been having significant burning upon urination. UA negative for infection. He is taking Pyridium with some relief. He also reports significant constipation and straining with bowel movements, currently taking colace and prune juice. He denies hematuria. He received lupron with Dr. Olguin in August 2022. He denies fevers, chills, CP, SOB, n/v, LE swelling. \par \par Core path reviewed (8/3/22)\par Prostate adenocarcinoma, grade group 5 (laura 4+5-9), PNI+\par Initial path revealed laura 4+3=7\par \par Laboratory studies reviewed at today's visit and notable for: WBC 3.59, Hb 8.5, Plt 208

## 2022-08-22 NOTE — PHYSICAL EXAM
[Fully active, able to carry on all pre-disease performance without restriction] : Status 0 - Fully active, able to carry on all pre-disease performance without restriction [Normal] : affect appropriate [de-identified] : generally well appearing male, NAD, pleasant

## 2022-08-22 NOTE — RESULTS/DATA
[FreeTextEntry1] : Mr. Washington presented at age 66 in May 2022 for evaluation of locally recurrent prostate adenocarcinoma. \par The patient has a medical history of HTN, DM.\par \par S/p salvage RT for locally recurrent prostate adenocarcinoma with Dr. Caballero. \par Initiated on bicalutamide and lupron. \par \par We discussed initiation of abiraterone (Zytiga) 1000mg daily + prednisone 5mg daily following radiation therapy. Patient informed that they should not eat for 2 hours before or 1 hour after taking abiraterone. Additional side effects of abiraterone were discussed including peripheral edema, hypertension, hyperglycemia, elevated liver enzymes, arthralgias and hypokalemia. He is in agreement with proceeding with abiraterone. \par \par He has stopped bicalutamide. Will send abiraterone to his pharmacy. \par \par

## 2022-08-23 ENCOUNTER — NON-APPOINTMENT (OUTPATIENT)
Age: 67
End: 2022-08-23

## 2022-08-23 LAB
ALBUMIN SERPL ELPH-MCNC: 4.4 G/DL
ALP BLD-CCNC: 45 U/L
ALT SERPL-CCNC: 24 U/L
ANION GAP SERPL CALC-SCNC: 16 MMOL/L
AST SERPL-CCNC: 23 U/L
BILIRUB SERPL-MCNC: 0.2 MG/DL
BUN SERPL-MCNC: 17 MG/DL
CALCIUM SERPL-MCNC: 9.9 MG/DL
CHLORIDE SERPL-SCNC: 96 MMOL/L
CO2 SERPL-SCNC: 17 MMOL/L
CREAT SERPL-MCNC: 0.9 MG/DL
EGFR: 95 ML/MIN/1.73M2
GLUCOSE SERPL-MCNC: 356 MG/DL
POTASSIUM SERPL-SCNC: 4.9 MMOL/L
PROT SERPL-MCNC: 7 G/DL
PSA FREE FLD-MCNC: NORMAL %
PSA FREE SERPL-MCNC: <0.01 NG/ML
PSA SERPL-MCNC: <0.01 NG/ML
SODIUM SERPL-SCNC: 129 MMOL/L

## 2022-08-25 LAB
TESTOST FREE SERPL-MCNC: 1.3 PG/ML
TESTOST SERPL-MCNC: <2.5 NG/DL

## 2022-09-02 ENCOUNTER — RX RENEWAL (OUTPATIENT)
Age: 67
End: 2022-09-02

## 2022-09-08 ENCOUNTER — RESULT CHARGE (OUTPATIENT)
Age: 67
End: 2022-09-08

## 2022-09-09 ENCOUNTER — APPOINTMENT (OUTPATIENT)
Dept: FAMILY MEDICINE | Facility: CLINIC | Age: 67
End: 2022-09-09

## 2022-09-09 ENCOUNTER — NON-APPOINTMENT (OUTPATIENT)
Age: 67
End: 2022-09-09

## 2022-09-09 VITALS
SYSTOLIC BLOOD PRESSURE: 144 MMHG | TEMPERATURE: 97.6 F | WEIGHT: 260 LBS | BODY MASS INDEX: 43.32 KG/M2 | HEIGHT: 65 IN | DIASTOLIC BLOOD PRESSURE: 80 MMHG | OXYGEN SATURATION: 97 % | RESPIRATION RATE: 15 BRPM | HEART RATE: 94 BPM

## 2022-09-09 DIAGNOSIS — K59.00 CONSTIPATION, UNSPECIFIED: ICD-10-CM

## 2022-09-09 PROCEDURE — 99214 OFFICE O/P EST MOD 30 MIN: CPT | Mod: 25

## 2022-09-09 PROCEDURE — 81003 URINALYSIS AUTO W/O SCOPE: CPT | Mod: QW

## 2022-09-09 PROCEDURE — 36415 COLL VENOUS BLD VENIPUNCTURE: CPT

## 2022-09-09 RX ORDER — MAGNESIUM CITRATE
1.75 SOLUTION, ORAL ORAL
Qty: 1 | Refills: 0 | Status: ACTIVE | COMMUNITY
Start: 2022-09-09 | End: 1900-01-01

## 2022-09-09 NOTE — PLAN
[FreeTextEntry1] : Constipation- fleet oil enema prescribed, if not effective patient has magnesium citrate ordered. educated on how to use enema and to not use both in the same day. \par \par DM2- taking metformin 1000mg BID, actos 30mg daily, and trulicity 0.5 units weekly. Last a1c was 9.5, actos was increased to 30mg. Patient now taking prednisone daily and will do so for the foreseeable future.  Educated on prednisone increasing BG, will most probably have to increase trulicity when a1c comes backe. Patient verbalized understanding. \par \par UA- glucose 500, and blood noted, no leukocytes or nitrites noted. due to symptoms and history urine cx ordered.\par \par HTN- bp moderately controlled at 144/80 using amlodipine 10mg, losartan 100mg and metoprolol 50mg daily. Medications reviewed and renewed. \par \par Hyperlipidemia- continue simvastatin 20mg daily. Medications reviewed and renewed.  \par \par increased stress- offered patient resources or medications for anxiety/depression. declines at this time. \par \par f/ui n 3 months

## 2022-09-09 NOTE — PHYSICAL EXAM
[Soft] : abdomen soft [Non Tender] : non-tender [Normal Bowel Sounds] : normal bowel sounds [Normal] : affect was normal and insight and judgment were intact

## 2022-09-09 NOTE — HISTORY OF PRESENT ILLNESS
[FreeTextEntry1] : med refills, constipation, pain with urination.  [de-identified] : 67y/o m w a pmh of prostate ca, dm2, htn, hyperlip, low vit d. He is currently undergoing radiation 1x a week. He complains of burning with urination and constipation. He has been taking myrbetriq and miralax with little relief. Is compliant with all mediations. Reports increased stress due to recent diagnosis and treatment of prostate ca.

## 2022-09-12 LAB
ESTIMATED AVERAGE GLUCOSE: 177 MG/DL
HBA1C MFR BLD HPLC: 7.8 %

## 2022-10-07 ENCOUNTER — OUTPATIENT (OUTPATIENT)
Dept: OUTPATIENT SERVICES | Facility: HOSPITAL | Age: 67
LOS: 1 days | End: 2022-10-07
Payer: MEDICARE

## 2022-10-07 DIAGNOSIS — C61 MALIGNANT NEOPLASM OF PROSTATE: ICD-10-CM

## 2022-10-10 ENCOUNTER — RESULT REVIEW (OUTPATIENT)
Age: 67
End: 2022-10-10

## 2022-10-10 ENCOUNTER — APPOINTMENT (OUTPATIENT)
Dept: HEMATOLOGY ONCOLOGY | Facility: CLINIC | Age: 67
End: 2022-10-10
Payer: MEDICARE

## 2022-10-10 VITALS
WEIGHT: 267.99 LBS | OXYGEN SATURATION: 98 % | BODY MASS INDEX: 44.65 KG/M2 | DIASTOLIC BLOOD PRESSURE: 93 MMHG | HEART RATE: 83 BPM | SYSTOLIC BLOOD PRESSURE: 158 MMHG | HEIGHT: 65 IN | TEMPERATURE: 97.2 F

## 2022-10-10 LAB
BASOPHILS # BLD AUTO: 0.02 K/UL — SIGNIFICANT CHANGE UP (ref 0–0.2)
BASOPHILS NFR BLD AUTO: 0.6 % — SIGNIFICANT CHANGE UP (ref 0–2)
EOSINOPHIL # BLD AUTO: 0.02 K/UL — SIGNIFICANT CHANGE UP (ref 0–0.5)
EOSINOPHIL NFR BLD AUTO: 0.6 % — SIGNIFICANT CHANGE UP (ref 0–6)
HCT VFR BLD CALC: 32.3 % — LOW (ref 39–50)
HGB BLD-MCNC: 10.8 G/DL — LOW (ref 13–17)
IMM GRANULOCYTES NFR BLD AUTO: 0.8 % — SIGNIFICANT CHANGE UP (ref 0–0.9)
LYMPHOCYTES # BLD AUTO: 0.53 K/UL — LOW (ref 1–3.3)
LYMPHOCYTES # BLD AUTO: 14.6 % — SIGNIFICANT CHANGE UP (ref 13–44)
MCHC RBC-ENTMCNC: 31.1 PG — SIGNIFICANT CHANGE UP (ref 27–34)
MCHC RBC-ENTMCNC: 33.4 GM/DL — SIGNIFICANT CHANGE UP (ref 32–36)
MCV RBC AUTO: 93.1 FL — SIGNIFICANT CHANGE UP (ref 80–100)
MONOCYTES # BLD AUTO: 0.35 K/UL — SIGNIFICANT CHANGE UP (ref 0–0.9)
MONOCYTES NFR BLD AUTO: 9.7 % — SIGNIFICANT CHANGE UP (ref 2–14)
NEUTROPHILS # BLD AUTO: 2.67 K/UL — SIGNIFICANT CHANGE UP (ref 1.8–7.4)
NEUTROPHILS NFR BLD AUTO: 73.7 % — SIGNIFICANT CHANGE UP (ref 43–77)
NRBC # BLD: 0 /100 WBCS — SIGNIFICANT CHANGE UP (ref 0–0)
PLATELET # BLD AUTO: 173 K/UL — SIGNIFICANT CHANGE UP (ref 150–400)
RBC # BLD: 3.47 M/UL — LOW (ref 4.2–5.8)
RBC # FLD: 12.3 % — SIGNIFICANT CHANGE UP (ref 10.3–14.5)
WBC # BLD: 3.62 K/UL — LOW (ref 3.8–10.5)
WBC # FLD AUTO: 3.62 K/UL — LOW (ref 3.8–10.5)

## 2022-10-10 PROCEDURE — 99214 OFFICE O/P EST MOD 30 MIN: CPT

## 2022-10-10 PROCEDURE — 85027 COMPLETE CBC AUTOMATED: CPT

## 2022-10-10 RX ORDER — PHENAZOPYRIDINE 200 MG/1
200 TABLET, FILM COATED ORAL 3 TIMES DAILY
Qty: 9 | Refills: 0 | Status: DISCONTINUED | COMMUNITY
Start: 2022-08-19 | End: 2022-10-10

## 2022-10-10 NOTE — HISTORY OF PRESENT ILLNESS
[de-identified] : Referred by: Dr. Linda Caballero\par Diagnosis: Locally recurrent prostate cancer\par \par Mr. Washington presented at age 66 in May 2022 for evaluation of locally recurrent prostate adenocarcinoma. \par The patient has a medical history of HTN, DM.\par \par Presenting HPI: Gildardo is referred for consultation for locally recurrent prostate cancer. He has a history of prostate cancer and initially underwent radical prostatectomy by his urologist about 15 years ago, denies any adjuvant therapy at that time.  He was subsequently lost to follow-up and recent PSA testing by his primary care physician showed an elevated PSA to 11. He underwent an MRI of the prostate which showed likely prostate bed recurrence of the prostatectomy bed.  There was a visible abnormality measuring 21 x 15 mm that involve the bladder neck and encases the proximal urethra.  He underwent biopsy of the lesion which confirmed [place] 4+3 carcinoma. PSMA PET CT was performed on 4/28/22 which initially was read as negative however upon further review showed focal PSMA uptake in the prostate bed as well as bilateral breast uptake likely correlating with gynecomastia.  He has now been initiated on bicalutamide and received his first lupron injection by urologist Dr. Olguin on Thursday 5/5/22. He is pending salvage radiation treatment with Dr. Caballero and has been referred to medical oncology for consideration of additional systemic therapies.\par \par At today's visit he reports that he is feeling generally very well.  He denies any urinary complaints at this time.  He has a good appetite and his weight is stable.  He also reports a good energy level.  He is here today with 2 of his sisters who he is very close with.\par \par PSA 4/8/22: 11.32\par PSA 11/19/21: 11 \par WBC 4.68, Hb 10.5, Plt 172 \par \par Imaging:\par PSMA PET CT 4/28/22: There is subtle focal PSMA uptake in the prostate surgical resection site in the right posterior lateral region adjacent to the ureter with an SUV max 11.6, 1.1 x 0.8 cm.  It corresponds to the 2.1 x 1.5 cm T2 hypointense lesion described on most recent prostate MRI.  It is adjacent to the distended urinary bladder making it difficult to distinguish from the overlying PSMA avid urine.  No evidence of PSMA avid adenopathy in the pelvis abdomen or above the diaphragm to suggest edinson mets.  There is some focal increased PSMA uptake in the left breast greater than right breast.  No evidence of PSMA avid metastatic disease in the bones.\par \par MRI prostate 3/3/22 - 2.1 x 1.5cm PI-RADS 5 lesion wrapping around the proximal urethra, anterior R lateral posterior with some tumor extending posterior superiorly behind the bladder neck and R seminal vesicle\par \par Path: \par Prostate lesion biopsy 3/28/22: Adenocarcinoma with extension into the muscle, laura 4+3=7, grade group 3, 80% involvement, measuring 12mm, PNI+\par Core path reviewed (8/3/22) Prostate adenocarcinoma, grade group 5 (laura 4+5-9), PNI+, Initial path revealed laura 4+3=7\par \par Genetics: not done \par \par HCM: \par - COVID vaccination: s/p 2 doses \par - Colonoscopy: never done \par \par SH: \par - Occupation: on disability \par - Living situation: lives in [place], does not have any children, he has a strong family support system with several sisters  \par - Smoking/etoh/illicits: never smoker, drinks at holidays, denies ilicits \par - Exercise: minimally active \par \par FH: \par - His sister had breast cancer around age 60 [de-identified] : Gildardo presents for follow up on 10/10/22 for prostate cancer. \par sIaiah August 2022- Dr. Olguin \par \par Since the last visit he reports he had continued urinary burning- was started on tamsulosin by Dr. Caballero which helped somewhat but now has persistent aching in his groin and at the tip of his penis. He saw Dr. Olguin (urology) and had a urine culture performed which was negative. He has been treated for constipation with colace/miralax. He states his symptoms are starting to improve. He started abiraterone + prednisone in August 2022. He denies hot flashes, fatigue, fevers, chills, CP, SOB, n/v, LE swelling. \par \par Laboratory studies reviewed at today's visit and notable for: WBC 3.62, Hb 10.8, Plt 173

## 2022-10-10 NOTE — RESULTS/DATA
[FreeTextEntry1] : Mr. Washington presented at age 66 in May 2022 for evaluation of locally recurrent prostate adenocarcinoma. \par The patient has a medical history of HTN, DM.\par \par S/p salvage RT for locally recurrent prostate adenocarcinoma with Dr. Caballero. \par Initiated abiraterone 8/2022, tolerating this well. \par Continue lupron injections with Dr. Olguin. \par Flomax for dysuria. Senna/miralax/colace for constipation. \par RTC 3 months. \par \par

## 2022-10-10 NOTE — PHYSICAL EXAM
[Fully active, able to carry on all pre-disease performance without restriction] : Status 0 - Fully active, able to carry on all pre-disease performance without restriction [Normal] : affect appropriate [de-identified] : generally well appearing male, NAD, pleasant

## 2022-10-11 ENCOUNTER — NON-APPOINTMENT (OUTPATIENT)
Age: 67
End: 2022-10-11

## 2022-10-11 LAB
ALBUMIN SERPL ELPH-MCNC: 4.3 G/DL
ALP BLD-CCNC: 54 U/L
ALT SERPL-CCNC: 24 U/L
ANION GAP SERPL CALC-SCNC: 14 MMOL/L
AST SERPL-CCNC: 21 U/L
BILIRUB SERPL-MCNC: 0.4 MG/DL
BUN SERPL-MCNC: 14 MG/DL
CALCIUM SERPL-MCNC: 9.7 MG/DL
CHLORIDE SERPL-SCNC: 98 MMOL/L
CO2 SERPL-SCNC: 23 MMOL/L
CREAT SERPL-MCNC: 0.72 MG/DL
EGFR: 101 ML/MIN/1.73M2
GLUCOSE SERPL-MCNC: 381 MG/DL
POTASSIUM SERPL-SCNC: 4.5 MMOL/L
PROT SERPL-MCNC: 7.5 G/DL
PSA FREE FLD-MCNC: NORMAL %
PSA FREE SERPL-MCNC: <0.01 NG/ML
PSA SERPL-MCNC: <0.01 NG/ML
SODIUM SERPL-SCNC: 135 MMOL/L

## 2022-10-14 LAB
TESTOST FREE SERPL-MCNC: 0.1 PG/ML
TESTOST SERPL-MCNC: <2.5 NG/DL

## 2022-10-26 ENCOUNTER — APPOINTMENT (OUTPATIENT)
Dept: HEMATOLOGY ONCOLOGY | Facility: CLINIC | Age: 67
End: 2022-10-26

## 2022-10-26 ENCOUNTER — RESULT REVIEW (OUTPATIENT)
Age: 67
End: 2022-10-26

## 2022-10-26 VITALS
OXYGEN SATURATION: 98 % | WEIGHT: 266 LBS | TEMPERATURE: 97.2 F | SYSTOLIC BLOOD PRESSURE: 161 MMHG | HEIGHT: 65 IN | HEART RATE: 82 BPM | BODY MASS INDEX: 44.32 KG/M2 | DIASTOLIC BLOOD PRESSURE: 93 MMHG

## 2022-10-26 DIAGNOSIS — R30.0 DYSURIA: ICD-10-CM

## 2022-10-26 PROCEDURE — 99214 OFFICE O/P EST MOD 30 MIN: CPT

## 2022-10-28 ENCOUNTER — NON-APPOINTMENT (OUTPATIENT)
Age: 67
End: 2022-10-28

## 2022-10-28 LAB
APPEARANCE: CLEAR
BACTERIA UR CULT: NORMAL
BILIRUBIN URINE: NEGATIVE
BLOOD URINE: NEGATIVE
COLOR: YELLOW
GLUCOSE QUALITATIVE U: ABNORMAL
KETONES URINE: NEGATIVE
LEUKOCYTE ESTERASE URINE: NEGATIVE
NITRITE URINE: NEGATIVE
PH URINE: 6
PROTEIN URINE: NORMAL
SPECIFIC GRAVITY URINE: 1.03
UROBILINOGEN URINE: NORMAL

## 2022-10-28 NOTE — HISTORY OF PRESENT ILLNESS
[de-identified] : Referred by: Dr. Linda Caballero\par Diagnosis: Locally recurrent prostate cancer\par \par Mr. Washington presented at age 66 in May 2022 for evaluation of locally recurrent prostate adenocarcinoma. \par The patient has a medical history of HTN, DM.\par \par Presenting HPI: Gildardo initially presented for locally recurrent prostate cancer. He reported a history of prostate cancer and initially underwent radical prostatectomy by his urologist about 15 years ago, denied any adjuvant therapy at that time.  He was subsequently lost to follow-up and recent PSA testing by his primary care physician showed an elevated PSA to 11. He underwent an MRI of the prostate which showed likely prostate bed recurrence of the prostatectomy bed.  There was a visible abnormality measuring 21 x 15 mm that involve the bladder neck and encases the proximal urethra.  He underwent biopsy of the lesion which confirmed [place] 4+3 carcinoma. PSMA PET CT was performed on 4/28/22 which initially was read as negative however upon further review showed focal PSMA uptake in the prostate bed as well as bilateral breast uptake likely correlating with gynecomastia.  He was initiated on bicalutamide and received his first lupron injection by urologist Dr. Olguin on 5/5/22. He completed salvage radiation treatment with Dr. Caballero in 8/2022 and was referred to medical oncology for consideration of additional systemic therapies.\par He was with 2 of his sisters who he is very close with.\par \par PSA 4/8/22: 11.32\par PSA 11/19/21: 11 \par WBC 4.68, Hb 10.5, Plt 172 \par \par Imaging:\par PSMA PET CT 4/28/22: There is subtle focal PSMA uptake in the prostate surgical resection site in the right posterior lateral region adjacent to the ureter with an SUV max 11.6, 1.1 x 0.8 cm.  It corresponds to the 2.1 x 1.5 cm T2 hypointense lesion described on most recent prostate MRI.  It is adjacent to the distended urinary bladder making it difficult to distinguish from the overlying PSMA avid urine.  No evidence of PSMA avid adenopathy in the pelvis abdomen or above the diaphragm to suggest edinson mets.  There is some focal increased PSMA uptake in the left breast greater than right breast.  No evidence of PSMA avid metastatic disease in the bones.\par \par MRI prostate 3/3/22 - 2.1 x 1.5cm PI-RADS 5 lesion wrapping around the proximal urethra, anterior R lateral posterior with some tumor extending posterior superiorly behind the bladder neck and R seminal vesicle\par \par Path: \par Prostate lesion biopsy 3/28/22: Adenocarcinoma with extension into the muscle, laura 4+3=7, grade group 3, 80% involvement, measuring 12mm, PNI+\par Core path reviewed (8/3/22) Prostate adenocarcinoma, grade group 5 (laura 4+5-9), PNI+, Initial path revealed laura 4+3=7\par \par Genetics: not done \par \par HCM: \par - COVID vaccination: s/p 2 doses \par - Colonoscopy: never done \par \par SH: \par - Occupation: on disability \par - Living situation: lives in [place], does not have any children, he has a strong family support system with several sisters  \par - Smoking/etoh/illicits: never smoker, drinks at holidays, denies ilicits \par - Exercise: minimally active \par \par FH: \par - His sister had breast cancer around age 60 [de-identified] : Gildardo presents for acute visit today on 10/26/22 for prostate cancer.  Accompanied by sister (Vanita)\nii Colon August 2022- Dr. Olguin - scheduled 11/16/2022\par \par He reports "throbbing" pain in the perineum and tip of the penis.  First noted with completion of radiation in 8/2022 and recently exacerbated.  Constipation also exacerbated in the past 3 weeks.  Saw Dr. Jurado (GI) - using Colace/Miralax as directed.  He is considering baseline colonoscopy.  Burning with urination has resolved.  He otherwise denies dysuria, frequency, hematuria, F/Cs, flank pain, N/V/D, abdominal discomfort, bleeding, or associated symptoms.\par \par He continues tamsulosin as per Dr. Caballero which helped somewhat but now has persistent discomfort as above.  He follows with Dr. Olguin (urology) - previous urine culture performed which was negative.  He started abiraterone + prednisone in August 2022 - continues with good tolerance. \par \par No hot flashes, fatigue, CP, SOB, LE swelling. \par \par Laboratory studies from 10/10/2022 showed elevated glucose for which he has been referred back to PCP.   WBC 3.62, Hb 10.8, Plt 173

## 2022-10-28 NOTE — RESULTS/DATA
[FreeTextEntry1] : Mr. Washington initially presented at age 66 in May 2022 for evaluation of locally recurrent prostate adenocarcinoma. \par The patient has a medical history of HTN, DM.\par \par S/p salvage RT for locally recurrent prostate adenocarcinoma with Dr. Caballero. \par Initiated abiraterone 8/2022, tolerating this well. \par Continue lupron injections with Dr. Olguin. \par Flomax for dysuria. Senna/miralax/colace for constipation. \par RTC 3 months\par Seen for acute visit today with discomfort of perineum/tip of penis as above.\par \par

## 2022-10-28 NOTE — PHYSICAL EXAM
[Fully active, able to carry on all pre-disease performance without restriction] : Status 0 - Fully active, able to carry on all pre-disease performance without restriction [Normal] : affect appropriate [de-identified] : Cooperative male, tearful [de-identified] : Consistent with previous radiation.  No new masses, sores, skin changes, or discharge noted.

## 2022-10-31 ENCOUNTER — APPOINTMENT (OUTPATIENT)
Dept: ULTRASOUND IMAGING | Facility: CLINIC | Age: 67
End: 2022-10-31

## 2022-10-31 ENCOUNTER — NON-APPOINTMENT (OUTPATIENT)
Age: 67
End: 2022-10-31

## 2022-10-31 ENCOUNTER — RX RENEWAL (OUTPATIENT)
Age: 67
End: 2022-10-31

## 2022-10-31 PROCEDURE — 76870 US EXAM SCROTUM: CPT

## 2022-10-31 PROCEDURE — 76857 US EXAM PELVIC LIMITED: CPT

## 2022-11-03 ENCOUNTER — NON-APPOINTMENT (OUTPATIENT)
Age: 67
End: 2022-11-03

## 2022-11-11 ENCOUNTER — RX RENEWAL (OUTPATIENT)
Age: 67
End: 2022-11-11

## 2022-12-06 ENCOUNTER — APPOINTMENT (OUTPATIENT)
Dept: FAMILY MEDICINE | Facility: CLINIC | Age: 67
End: 2022-12-06

## 2022-12-06 VITALS
TEMPERATURE: 97.5 F | OXYGEN SATURATION: 96 % | WEIGHT: 260 LBS | DIASTOLIC BLOOD PRESSURE: 84 MMHG | HEIGHT: 65 IN | SYSTOLIC BLOOD PRESSURE: 146 MMHG | HEART RATE: 94 BPM | RESPIRATION RATE: 15 BRPM | BODY MASS INDEX: 43.32 KG/M2

## 2022-12-06 DIAGNOSIS — R05.9 COUGH, UNSPECIFIED: ICD-10-CM

## 2022-12-06 PROCEDURE — 36415 COLL VENOUS BLD VENIPUNCTURE: CPT

## 2022-12-06 PROCEDURE — 99214 OFFICE O/P EST MOD 30 MIN: CPT | Mod: 25

## 2022-12-07 LAB
ESTIMATED AVERAGE GLUCOSE: 212 MG/DL
HBA1C MFR BLD HPLC: 9 %

## 2022-12-07 NOTE — HEALTH RISK ASSESSMENT
[No falls in past year] : Patient reported no falls in the past year [0] : 2) Feeling down, depressed, or hopeless: Not at all (0) [PHQ-2 Negative - No further assessment needed] : PHQ-2 Negative - No further assessment needed [[id number]] : 0

## 2022-12-07 NOTE — PLAN
[FreeTextEntry1] : DM- A1c 7.8, expected increase due to prednisone, a1c ordered, will evaluate and change medication as needed. Patient is aware of this. Currently taking metformin 1000mg BID, actos 30mg and trulicity 3mg weekly. \par \par Cough- lung sounds clear in all lobes, promethazine dm prescribed and resp panel ordered.\par \par HTN- BP in office 146/84, not optimal, but has improved since last visit of 161/93. Taking amlodipine 10mg losartan and metoprolol. Will continue to monitor at next visit to see if BP continues to decrease.\par \par F/u in 3 months.

## 2022-12-09 LAB
ALBUMIN SERPL ELPH-MCNC: 4.5 G/DL
ALP BLD-CCNC: 66 U/L
ALT SERPL-CCNC: 27 U/L
ANION GAP SERPL CALC-SCNC: 16 MMOL/L
AST SERPL-CCNC: 25 U/L
BILIRUB SERPL-MCNC: 0.4 MG/DL
BUN SERPL-MCNC: 14 MG/DL
CALCIUM SERPL-MCNC: 9.7 MG/DL
CHLORIDE SERPL-SCNC: 97 MMOL/L
CO2 SERPL-SCNC: 19 MMOL/L
CREAT SERPL-MCNC: 0.65 MG/DL
EGFR: 105 ML/MIN/1.73M2
GLUCOSE SERPL-MCNC: 329 MG/DL
POTASSIUM SERPL-SCNC: 4.2 MMOL/L
PROT SERPL-MCNC: 7.5 G/DL
RAPID RVP RESULT: DETECTED
SARS-COV-2 RNA PNL RESP NAA+PROBE: DETECTED
SODIUM SERPL-SCNC: 132 MMOL/L

## 2022-12-12 ENCOUNTER — RX RENEWAL (OUTPATIENT)
Age: 67
End: 2022-12-12

## 2022-12-13 ENCOUNTER — APPOINTMENT (OUTPATIENT)
Dept: ENDOCRINOLOGY | Facility: CLINIC | Age: 67
End: 2022-12-13

## 2022-12-13 ENCOUNTER — NON-APPOINTMENT (OUTPATIENT)
Age: 67
End: 2022-12-13

## 2022-12-13 NOTE — CONSULT LETTER
[FreeTextEntry1] : Kalyan Freeman [FreeTextEntry2] : diabetes management [FreeTextEntry3] : 68 y/o male with type 2 diabetes, and recent decline in control with A1C of 9. ON metformin, pioglitazone, and trulicity at 3 mg daily.\par past h/o prostate CA, and currently on prednisone ? 5 mg daily. [FreeTextEntry4] : DM type 2, uncontrolled, likely in need of additional drug therapy.\par At this time there seem to be two options:\par 1. Add a sglt-2, such as farxiga 5 mg or jardiance 10 mg. THis will be especially helpful if he is at risk for CHF.\par 2. Add basal insulin in combination with the orals, such as lantus 20-30 units HS to start, and titrate until morning glucose levels are typically in the low 100 range.  [FreeTextEntry5] : Feel free to reconsult if necessary

## 2022-12-16 RX ORDER — BLOOD-GLUCOSE METER
W/DEVICE EACH MISCELLANEOUS
Qty: 1 | Refills: 0 | Status: DISCONTINUED | COMMUNITY
Start: 2022-12-16 | End: 2022-12-16

## 2022-12-16 RX ORDER — INSULIN GLARGINE 100 [IU]/ML
100 INJECTION, SOLUTION SUBCUTANEOUS AT BEDTIME
Qty: 1 | Refills: 0 | Status: DISCONTINUED | COMMUNITY
Start: 2022-12-16 | End: 2022-12-16

## 2022-12-16 RX ORDER — SYRINGE-NEEDLE,INSULIN,0.5 ML 27GX1/2"
27G X 1/2" SYRINGE, EMPTY DISPOSABLE MISCELLANEOUS
Qty: 3 | Refills: 1 | Status: DISCONTINUED | COMMUNITY
Start: 2022-12-16 | End: 2022-12-16

## 2022-12-30 ENCOUNTER — OUTPATIENT (OUTPATIENT)
Dept: OUTPATIENT SERVICES | Facility: HOSPITAL | Age: 67
LOS: 1 days | End: 2022-12-30
Payer: MEDICARE

## 2022-12-30 DIAGNOSIS — C61 MALIGNANT NEOPLASM OF PROSTATE: ICD-10-CM

## 2023-01-06 ENCOUNTER — LABORATORY RESULT (OUTPATIENT)
Age: 68
End: 2023-01-06

## 2023-01-06 ENCOUNTER — RESULT REVIEW (OUTPATIENT)
Age: 68
End: 2023-01-06

## 2023-01-06 ENCOUNTER — APPOINTMENT (OUTPATIENT)
Dept: HEMATOLOGY ONCOLOGY | Facility: CLINIC | Age: 68
End: 2023-01-06
Payer: MEDICARE

## 2023-01-06 VITALS
BODY MASS INDEX: 44.02 KG/M2 | OXYGEN SATURATION: 98 % | HEIGHT: 65 IN | DIASTOLIC BLOOD PRESSURE: 81 MMHG | HEART RATE: 83 BPM | SYSTOLIC BLOOD PRESSURE: 147 MMHG | TEMPERATURE: 97.3 F | WEIGHT: 264.2 LBS

## 2023-01-06 LAB
BASOPHILS # BLD AUTO: 0.02 K/UL — SIGNIFICANT CHANGE UP (ref 0–0.2)
BASOPHILS NFR BLD AUTO: 0.4 % — SIGNIFICANT CHANGE UP (ref 0–2)
EOSINOPHIL # BLD AUTO: 0.04 K/UL — SIGNIFICANT CHANGE UP (ref 0–0.5)
EOSINOPHIL NFR BLD AUTO: 0.8 % — SIGNIFICANT CHANGE UP (ref 0–6)
HCT VFR BLD CALC: 31.9 % — LOW (ref 39–50)
HGB BLD-MCNC: 10.2 G/DL — LOW (ref 13–17)
IMM GRANULOCYTES NFR BLD AUTO: 0.6 % — SIGNIFICANT CHANGE UP (ref 0–0.9)
LYMPHOCYTES # BLD AUTO: 0.47 K/UL — LOW (ref 1–3.3)
LYMPHOCYTES # BLD AUTO: 9.2 % — LOW (ref 13–44)
MCHC RBC-ENTMCNC: 29.8 PG — SIGNIFICANT CHANGE UP (ref 27–34)
MCHC RBC-ENTMCNC: 32 GM/DL — SIGNIFICANT CHANGE UP (ref 32–36)
MCV RBC AUTO: 93.3 FL — SIGNIFICANT CHANGE UP (ref 80–100)
MONOCYTES # BLD AUTO: 0.46 K/UL — SIGNIFICANT CHANGE UP (ref 0–0.9)
MONOCYTES NFR BLD AUTO: 9 % — SIGNIFICANT CHANGE UP (ref 2–14)
NEUTROPHILS # BLD AUTO: 4.07 K/UL — SIGNIFICANT CHANGE UP (ref 1.8–7.4)
NEUTROPHILS NFR BLD AUTO: 80 % — HIGH (ref 43–77)
NRBC # BLD: 0 /100 WBCS — SIGNIFICANT CHANGE UP (ref 0–0)
PLATELET # BLD AUTO: 198 K/UL — SIGNIFICANT CHANGE UP (ref 150–400)
RBC # BLD: 3.42 M/UL — LOW (ref 4.2–5.8)
RBC # FLD: 12.8 % — SIGNIFICANT CHANGE UP (ref 10.3–14.5)
WBC # BLD: 5.09 K/UL — SIGNIFICANT CHANGE UP (ref 3.8–10.5)
WBC # FLD AUTO: 5.09 K/UL — SIGNIFICANT CHANGE UP (ref 3.8–10.5)

## 2023-01-06 PROCEDURE — 99213 OFFICE O/P EST LOW 20 MIN: CPT

## 2023-01-06 PROCEDURE — 85027 COMPLETE CBC AUTOMATED: CPT

## 2023-01-06 NOTE — HISTORY OF PRESENT ILLNESS
[de-identified] : Referred by: Dr. Linda Caballero\par Diagnosis: Locally recurrent prostate cancer\par \par Mr. Washington presented at age 66 in May 2022 for evaluation of locally recurrent prostate adenocarcinoma. \par The patient has a medical history of HTN, DM.\par \par Presenting HPI: Gildardo is referred for consultation for locally recurrent prostate cancer. He has a history of prostate cancer and initially underwent radical prostatectomy by his urologist about 15 years ago, denies any adjuvant therapy at that time.  He was subsequently lost to follow-up and recent PSA testing by his primary care physician showed an elevated PSA to 11. He underwent an MRI of the prostate which showed likely prostate bed recurrence of the prostatectomy bed.  There was a visible abnormality measuring 21 x 15 mm that involve the bladder neck and encases the proximal urethra.  He underwent biopsy of the lesion which confirmed [place] 4+3 carcinoma. PSMA PET CT was performed on 4/28/22 which initially was read as negative however upon further review showed focal PSMA uptake in the prostate bed as well as bilateral breast uptake likely correlating with gynecomastia.  He has now been initiated on bicalutamide and received his first lupron injection by urologist Dr. Olguin on Thursday 5/5/22. He is pending salvage radiation treatment with Dr. Caballero and has been referred to medical oncology for consideration of additional systemic therapies.\par \par At today's visit he reports that he is feeling generally very well.  He denies any urinary complaints at this time.  He has a good appetite and his weight is stable.  He also reports a good energy level.  He is here today with 2 of his sisters who he is very close with.\par \par PSA 4/8/22: 11.32\par PSA 11/19/21: 11 \par \par Imaging:\par PSMA PET CT 4/28/22: There is subtle focal PSMA uptake in the prostate surgical resection site in the right posterior lateral region adjacent to the ureter with an SUV max 11.6, 1.1 x 0.8 cm.  It corresponds to the 2.1 x 1.5 cm T2 hypointense lesion described on most recent prostate MRI.  It is adjacent to the distended urinary bladder making it difficult to distinguish from the overlying PSMA avid urine.  No evidence of PSMA avid adenopathy in the pelvis abdomen or above the diaphragm to suggest edinson mets.  There is some focal increased PSMA uptake in the left breast greater than right breast.  No evidence of PSMA avid metastatic disease in the bones.\par \par MRI prostate 3/3/22 - 2.1 x 1.5cm PI-RADS 5 lesion wrapping around the proximal urethra, anterior R lateral posterior with some tumor extending posterior superiorly behind the bladder neck and R seminal vesicle\par \par Path: \par Prostate lesion biopsy 3/28/22: Adenocarcinoma with extension into the muscle, laura 4+3=7, grade group 3, 80% involvement, measuring 12mm, PNI+\par Core path reviewed (8/3/22) Prostate adenocarcinoma, grade group 5 (laura 4+5-9), PNI+, Initial path revealed laura 4+3=7\par \par Genetics: not done \par \par HCM: \par - COVID vaccination: s/p 2 doses \par - Colonoscopy: never done \par \par SH: \par - Occupation: on disability \par - Living situation: lives in [place], does not have any children, he has a strong family support system with several sisters  \par - Smoking/etoh/illicits: never smoker, drinks at holidays, denies ilicits \par - Exercise: minimally active \par \par FH: \par - His sister had breast cancer around age 60 [de-identified] : Gildardo presents for follow up on 1/6/23 for prostate cancer. \par Isaiah August 2022- Dr. Olguin \par \par Testicular US 10/31/22 reviewed: mildly distended urinary bladder, grossly unremarkable pelvic US\par Diffuse scrotal skin thickening, probably testicular atrophic change and microlithiasis\par 1cm sized hypoechoic structure between the R epididymis and testes, indeterminate\par UA negative for UTI \par \par At today's visit he is feeling very well. His burning on urination improved. He remains on abiraterone and prednisone. Has hot flashes daily, these don’t interfere with his life.  He denies fatigue, fevers, chills, CP, SOB, n/v, LE swelling. \par \par Laboratory studies reviewed at today's visit and notable for: WBC 5.09, Hb 10.2, Plt 198 \par

## 2023-01-06 NOTE — PHYSICAL EXAM
[Fully active, able to carry on all pre-disease performance without restriction] : Status 0 - Fully active, able to carry on all pre-disease performance without restriction [Normal] : affect appropriate [de-identified] : generally well appearing male, NAD, pleasant

## 2023-01-06 NOTE — RESULTS/DATA
[FreeTextEntry1] : Mr. Washington presented at age 66 in May 2022 for evaluation of locally recurrent prostate adenocarcinoma. \par The patient has a medical history of HTN, DM.\par \par S/p salvage RT for locally recurrent prostate adenocarcinoma with Dr. Caballero. \par Initiated abiraterone 8/2022, tolerating this well. \par Continue lupron injections with Dr. Olguin. \par Dysuria and consipation resolved. \par Will send testicular US results to Dr. Olguin for review. Patient is now asymptomatic. \par RTC 3 months. \par \par

## 2023-01-09 LAB
ALBUMIN SERPL ELPH-MCNC: 4.3 G/DL
ALP BLD-CCNC: 65 U/L
ALT SERPL-CCNC: 18 U/L
ANION GAP SERPL CALC-SCNC: 14 MMOL/L
AST SERPL-CCNC: 15 U/L
BILIRUB SERPL-MCNC: 0.4 MG/DL
BUN SERPL-MCNC: 15 MG/DL
CALCIUM SERPL-MCNC: 9.8 MG/DL
CHLORIDE SERPL-SCNC: 96 MMOL/L
CO2 SERPL-SCNC: 24 MMOL/L
CREAT SERPL-MCNC: 0.75 MG/DL
EGFR: 100 ML/MIN/1.73M2
GLUCOSE SERPL-MCNC: 286 MG/DL
POTASSIUM SERPL-SCNC: 4.7 MMOL/L
PROT SERPL-MCNC: 7.3 G/DL
PSA FREE FLD-MCNC: NORMAL %
PSA FREE SERPL-MCNC: <0.01 NG/ML
PSA SERPL-MCNC: <0.01 NG/ML
SODIUM SERPL-SCNC: 133 MMOL/L

## 2023-02-13 ENCOUNTER — RX RENEWAL (OUTPATIENT)
Age: 68
End: 2023-02-13

## 2023-02-28 ENCOUNTER — APPOINTMENT (OUTPATIENT)
Dept: FAMILY MEDICINE | Facility: CLINIC | Age: 68
End: 2023-02-28
Payer: MEDICARE

## 2023-02-28 VITALS
OXYGEN SATURATION: 99 % | TEMPERATURE: 97 F | WEIGHT: 275 LBS | HEIGHT: 65 IN | RESPIRATION RATE: 16 BRPM | SYSTOLIC BLOOD PRESSURE: 126 MMHG | HEART RATE: 86 BPM | DIASTOLIC BLOOD PRESSURE: 68 MMHG | BODY MASS INDEX: 45.82 KG/M2

## 2023-02-28 PROCEDURE — 36415 COLL VENOUS BLD VENIPUNCTURE: CPT

## 2023-02-28 PROCEDURE — 99213 OFFICE O/P EST LOW 20 MIN: CPT | Mod: 25

## 2023-02-28 NOTE — PLAN
[FreeTextEntry1] : Seen endo in the past- recommend follow -up\par Fasting labs\par Medications refilled  Follow with oncology

## 2023-02-28 NOTE — HISTORY OF PRESENT ILLNESS
[FreeTextEntry1] : Presents for refills and fasting lab work.  Continues to follow with Oncology, post radiation with his prostate Ca.

## 2023-03-03 LAB
ALBUMIN SERPL ELPH-MCNC: 4.4 G/DL
ALP BLD-CCNC: 60 U/L
ALT SERPL-CCNC: 18 U/L
ANION GAP SERPL CALC-SCNC: 15 MMOL/L
AST SERPL-CCNC: 15 U/L
BILIRUB SERPL-MCNC: 0.4 MG/DL
BUN SERPL-MCNC: 12 MG/DL
CALCIUM SERPL-MCNC: 9.7 MG/DL
CHLORIDE SERPL-SCNC: 99 MMOL/L
CO2 SERPL-SCNC: 20 MMOL/L
CREAT SERPL-MCNC: 0.76 MG/DL
EGFR: 100 ML/MIN/1.73M2
GLUCOSE SERPL-MCNC: 241 MG/DL
POTASSIUM SERPL-SCNC: 4.4 MMOL/L
PROT SERPL-MCNC: 7 G/DL
SODIUM SERPL-SCNC: 134 MMOL/L

## 2023-03-07 LAB
25(OH)D3 SERPL-MCNC: 41.3 NG/ML
BASOPHILS # BLD AUTO: 0.01 K/UL
BASOPHILS NFR BLD AUTO: 0.2 %
CHOLEST SERPL-MCNC: 168 MG/DL
EOSINOPHIL # BLD AUTO: 0.05 K/UL
EOSINOPHIL NFR BLD AUTO: 1.2 %
ESTIMATED AVERAGE GLUCOSE: 189 MG/DL
HBA1C MFR BLD HPLC: 8.2 %
HCT VFR BLD CALC: 32.2 %
HDLC SERPL-MCNC: 68 MG/DL
HGB BLD-MCNC: 10.2 G/DL
IMM GRANULOCYTES NFR BLD AUTO: 0.7 %
LDLC SERPL CALC-MCNC: 86 MG/DL
LYMPHOCYTES # BLD AUTO: 0.55 K/UL
LYMPHOCYTES NFR BLD AUTO: 13.5 %
MAN DIFF?: NORMAL
MCHC RBC-ENTMCNC: 30.5 PG
MCHC RBC-ENTMCNC: 31.7 GM/DL
MCV RBC AUTO: 96.4 FL
MONOCYTES # BLD AUTO: 0.51 K/UL
MONOCYTES NFR BLD AUTO: 12.5 %
NEUTROPHILS # BLD AUTO: 2.93 K/UL
NEUTROPHILS NFR BLD AUTO: 71.9 %
NONHDLC SERPL-MCNC: 100 MG/DL
PLATELET # BLD AUTO: 173 K/UL
RBC # BLD: 3.34 M/UL
RBC # FLD: 13.2 %
TRIGL SERPL-MCNC: 67 MG/DL
TSH SERPL-ACNC: 1.14 UIU/ML
WBC # FLD AUTO: 4.08 K/UL

## 2023-04-05 ENCOUNTER — OUTPATIENT (OUTPATIENT)
Dept: OUTPATIENT SERVICES | Facility: HOSPITAL | Age: 68
LOS: 1 days | End: 2023-04-05
Payer: MEDICARE

## 2023-04-05 DIAGNOSIS — C61 MALIGNANT NEOPLASM OF PROSTATE: ICD-10-CM

## 2023-04-06 ENCOUNTER — RESULT REVIEW (OUTPATIENT)
Age: 68
End: 2023-04-06

## 2023-04-06 ENCOUNTER — LABORATORY RESULT (OUTPATIENT)
Age: 68
End: 2023-04-06

## 2023-04-06 ENCOUNTER — APPOINTMENT (OUTPATIENT)
Dept: HEMATOLOGY ONCOLOGY | Facility: CLINIC | Age: 68
End: 2023-04-06
Payer: MEDICARE

## 2023-04-06 VITALS
DIASTOLIC BLOOD PRESSURE: 93 MMHG | WEIGHT: 275.2 LBS | TEMPERATURE: 97.2 F | OXYGEN SATURATION: 99 % | HEART RATE: 98 BPM | BODY MASS INDEX: 45.85 KG/M2 | HEIGHT: 65 IN | SYSTOLIC BLOOD PRESSURE: 162 MMHG

## 2023-04-06 LAB
BASOPHILS # BLD AUTO: 0.02 K/UL — SIGNIFICANT CHANGE UP (ref 0–0.2)
BASOPHILS NFR BLD AUTO: 0.5 % — SIGNIFICANT CHANGE UP (ref 0–2)
EOSINOPHIL # BLD AUTO: 0.08 K/UL — SIGNIFICANT CHANGE UP (ref 0–0.5)
EOSINOPHIL NFR BLD AUTO: 2.2 % — SIGNIFICANT CHANGE UP (ref 0–6)
HCT VFR BLD CALC: 32.1 % — LOW (ref 39–50)
HGB BLD-MCNC: 10.5 G/DL — LOW (ref 13–17)
IMM GRANULOCYTES NFR BLD AUTO: 0.8 % — SIGNIFICANT CHANGE UP (ref 0–0.9)
LYMPHOCYTES # BLD AUTO: 0.86 K/UL — LOW (ref 1–3.3)
LYMPHOCYTES # BLD AUTO: 23.2 % — SIGNIFICANT CHANGE UP (ref 13–44)
MCHC RBC-ENTMCNC: 31 PG — SIGNIFICANT CHANGE UP (ref 27–34)
MCHC RBC-ENTMCNC: 32.7 GM/DL — SIGNIFICANT CHANGE UP (ref 32–36)
MCV RBC AUTO: 94.7 FL — SIGNIFICANT CHANGE UP (ref 80–100)
MONOCYTES # BLD AUTO: 0.5 K/UL — SIGNIFICANT CHANGE UP (ref 0–0.9)
MONOCYTES NFR BLD AUTO: 13.5 % — SIGNIFICANT CHANGE UP (ref 2–14)
NEUTROPHILS # BLD AUTO: 2.21 K/UL — SIGNIFICANT CHANGE UP (ref 1.8–7.4)
NEUTROPHILS NFR BLD AUTO: 59.8 % — SIGNIFICANT CHANGE UP (ref 43–77)
NRBC # BLD: 0 /100 WBCS — SIGNIFICANT CHANGE UP (ref 0–0)
PLATELET # BLD AUTO: 169 K/UL — SIGNIFICANT CHANGE UP (ref 150–400)
RBC # BLD: 3.39 M/UL — LOW (ref 4.2–5.8)
RBC # FLD: 12.2 % — SIGNIFICANT CHANGE UP (ref 10.3–14.5)
WBC # BLD: 3.7 K/UL — LOW (ref 3.8–10.5)
WBC # FLD AUTO: 3.7 K/UL — LOW (ref 3.8–10.5)

## 2023-04-06 PROCEDURE — 99214 OFFICE O/P EST MOD 30 MIN: CPT

## 2023-04-06 PROCEDURE — 85027 COMPLETE CBC AUTOMATED: CPT

## 2023-04-06 NOTE — HISTORY OF PRESENT ILLNESS
[de-identified] : Referred by: Dr. Linda Caballero\par Diagnosis: Locally recurrent prostate cancer\par \par Mr. Washington initially presented at age 66 in May 2022 for evaluation of locally recurrent prostate adenocarcinoma. \par The patient has a medical history of HTN, DM.\par \par Presenting HPI: Gildardo was referred for consultation for locally recurrent prostate cancer. He reported a history of prostate cancer and initially underwent radical prostatectomy by his urologist about 15 years ago, denies any adjuvant therapy at that time.  He was subsequently lost to follow-up and recent PSA testing by his primary care physician showed an elevated PSA to 11. He underwent an MRI of the prostate which showed likely prostate bed recurrence of the prostatectomy bed.  There was a visible abnormality measuring 21 x 15 mm that involve the bladder neck and encases the proximal urethra.  He underwent biopsy of the lesion which confirmed [place] 4+3 carcinoma. PSMA PET CT was performed on 4/28/22 which initially was read as negative however upon further review showed focal PSMA uptake in the prostate bed as well as bilateral breast uptake likely correlating with gynecomastia.  He has now been initiated on bicalutamide and received his first lupron injection by urologist Dr. Olguin on Thursday 5/5/22. He was pending salvage radiation treatment with Dr. Caballero and was referred to medical oncology for consideration of additional systemic therapies.\par \par He reported that he was feeling generally very well.  He denied any urinary complaints at this time.  He had a good appetite and his weight is stable.  He also reported a good energy level.  He presented with 2 of his sisters who he is very close with.\par \par PSA 4/8/22: 11.32\par PSA 11/19/21: 11 \par \par Imaging:\par PSMA PET CT 4/28/22: There is subtle focal PSMA uptake in the prostate surgical resection site in the right posterior lateral region adjacent to the ureter with an SUV max 11.6, 1.1 x 0.8 cm.  It corresponds to the 2.1 x 1.5 cm T2 hypointense lesion described on most recent prostate MRI.  It is adjacent to the distended urinary bladder making it difficult to distinguish from the overlying PSMA avid urine.  No evidence of PSMA avid adenopathy in the pelvis abdomen or above the diaphragm to suggest edinson mets.  There is some focal increased PSMA uptake in the left breast greater than right breast.  No evidence of PSMA avid metastatic disease in the bones.\par \par MRI prostate 3/3/22 - 2.1 x 1.5cm PI-RADS 5 lesion wrapping around the proximal urethra, anterior R lateral posterior with some tumor extending posterior superiorly behind the bladder neck and R seminal vesicle\par \par Path: \par Prostate lesion biopsy 3/28/22: Adenocarcinoma with extension into the muscle, laura 4+3=7, grade group 3, 80% involvement, measuring 12mm, PNI+\par Core path reviewed (8/3/22) Prostate adenocarcinoma, grade group 5 (laura 4+5-9), PNI+, Initial path revealed laura 4+3=7\par \par Genetics: not done \par \par HCM: \par - COVID vaccination: s/p 2 doses \par - Colonoscopy: never done \par \par SH: \par - Occupation: on disability \par - Living situation: lives in [place], does not have any children, he has a strong family support system with several sisters  \par - Smoking/etoh/illicits: never smoker, drinks at holidays, denies ilicits \par - Exercise: minimally active \par \par FH: \par - His sister had breast cancer around age 60 [de-identified] : Gildardo presents for follow up on 4/6/23 for prostate cancer. \par Isaiah 3/2023 - Dr. Olguin \par \par Testicular US 10/31/22 reviewed: mildly distended urinary bladder, grossly unremarkable pelvic US\par Diffuse scrotal skin thickening, probably testicular atrophic change and microlithiasis\par 1cm sized hypoechoic structure between the R epididymis and testes, indeterminate\par UA negative for UTI \par \par At today's visit he is feeling very well. His burning on urination resolved after last visit.  Following with Dr. Olguin - Lupron given 3/23.  He continues abiraterone (4 tabs q am) and prednisone. Following with PCP for DM.  Has hot flashes daily, these don’t interfere with his life.  He denies fatigue, fevers, chills, CP, SOB, n/v, LE swelling. \par \par Laboratory studies reviewed at today's visit and notable for: WBC 3.70, Hb 10.5, Plt 169\par

## 2023-04-06 NOTE — PHYSICAL EXAM
[Fully active, able to carry on all pre-disease performance without restriction] : Status 0 - Fully active, able to carry on all pre-disease performance without restriction [Normal] : affect appropriate [de-identified] : generally well appearing male, NAD, pleasant

## 2023-04-06 NOTE — RESULTS/DATA
[FreeTextEntry1] : Mr. Washington initially presented at age 66 in May 2022 for evaluation of locally recurrent prostate adenocarcinoma. \par The patient has a medical history of HTN, DM.\par \par S/p salvage RT for locally recurrent prostate adenocarcinoma with Dr. Caballero. \par Initiated abiraterone 8/2022, tolerating this well. \par Continue Lupron injections with Dr. Olguin q 12 weeks\par Dysuria and consipation resolved. \par RTC 3 months w/ Dr. Small, sooner prn\par Following with PCP for elevated glucose levels\par \par

## 2023-04-07 LAB
ALBUMIN SERPL ELPH-MCNC: 4.7 G/DL
ALP BLD-CCNC: 57 U/L
ALT SERPL-CCNC: 18 U/L
ANION GAP SERPL CALC-SCNC: 18 MMOL/L
AST SERPL-CCNC: 17 U/L
BILIRUB SERPL-MCNC: 0.5 MG/DL
BUN SERPL-MCNC: 20 MG/DL
CALCIUM SERPL-MCNC: 10.3 MG/DL
CHLORIDE SERPL-SCNC: 98 MMOL/L
CO2 SERPL-SCNC: 18 MMOL/L
CREAT SERPL-MCNC: 0.86 MG/DL
EGFR: 96 ML/MIN/1.73M2
FERRITIN SERPL-MCNC: 332 NG/ML
FOLATE SERPL-MCNC: 8.1 NG/ML
GLUCOSE SERPL-MCNC: 195 MG/DL
IRON SATN MFR SERPL: 22 %
IRON SERPL-MCNC: 91 UG/DL
POTASSIUM SERPL-SCNC: 4 MMOL/L
PROT SERPL-MCNC: 7.5 G/DL
PSA FREE FLD-MCNC: NORMAL %
PSA FREE SERPL-MCNC: <0.01 NG/ML
PSA SERPL-MCNC: <0.01 NG/ML
SODIUM SERPL-SCNC: 134 MMOL/L
TIBC SERPL-MCNC: 421 UG/DL
UIBC SERPL-MCNC: 330 UG/DL
VIT B12 SERPL-MCNC: 253 PG/ML

## 2023-05-23 ENCOUNTER — APPOINTMENT (OUTPATIENT)
Dept: FAMILY MEDICINE | Facility: CLINIC | Age: 68
End: 2023-05-23
Payer: MEDICARE

## 2023-05-23 ENCOUNTER — APPOINTMENT (OUTPATIENT)
Dept: ORTHOPEDIC SURGERY | Facility: CLINIC | Age: 68
End: 2023-05-23
Payer: MEDICARE

## 2023-05-23 VITALS
BODY MASS INDEX: 48.15 KG/M2 | HEIGHT: 65 IN | DIASTOLIC BLOOD PRESSURE: 80 MMHG | HEART RATE: 91 BPM | WEIGHT: 289 LBS | SYSTOLIC BLOOD PRESSURE: 150 MMHG | OXYGEN SATURATION: 98 % | RESPIRATION RATE: 16 BRPM | TEMPERATURE: 97.8 F

## 2023-05-23 DIAGNOSIS — M47.816 SPONDYLOSIS W/OUT MYELOPATHY OR RADICULOPATHY, LUMBAR REGION: ICD-10-CM

## 2023-05-23 PROCEDURE — 99213 OFFICE O/P EST LOW 20 MIN: CPT | Mod: 25

## 2023-05-23 PROCEDURE — 36415 COLL VENOUS BLD VENIPUNCTURE: CPT

## 2023-05-23 PROCEDURE — 72100 X-RAY EXAM L-S SPINE 2/3 VWS: CPT | Mod: FY

## 2023-05-23 PROCEDURE — 99204 OFFICE O/P NEW MOD 45 MIN: CPT

## 2023-05-23 RX ORDER — DAPAGLIFLOZIN 5 MG/1
5 TABLET, FILM COATED ORAL DAILY
Qty: 90 | Refills: 0 | Status: COMPLETED | COMMUNITY
Start: 2022-12-16 | End: 2023-05-23

## 2023-05-23 RX ORDER — TRAMADOL HYDROCHLORIDE 50 MG/1
50 TABLET, COATED ORAL
Qty: 30 | Refills: 0 | Status: COMPLETED | COMMUNITY
Start: 2022-10-26 | End: 2023-05-23

## 2023-05-23 RX ORDER — PROMETHAZINE HYDROCHLORIDE AND DEXTROMETHORPHAN HYDROBROMIDE ORAL SOLUTION 15; 6.25 MG/5ML; MG/5ML
6.25-15 SOLUTION ORAL
Qty: 300 | Refills: 1 | Status: COMPLETED | COMMUNITY
Start: 2022-12-06 | End: 2023-05-23

## 2023-05-23 RX ORDER — MINERAL OIL 100 G/100ML
ENEMA RECTAL DAILY
Qty: 2 | Refills: 0 | Status: COMPLETED | COMMUNITY
Start: 2022-09-09 | End: 2023-05-23

## 2023-05-23 RX ORDER — PHENAZOPYRIDINE 200 MG/1
200 TABLET, FILM COATED ORAL 3 TIMES DAILY
Qty: 9 | Refills: 0 | Status: COMPLETED | COMMUNITY
Start: 2022-08-24 | End: 2023-05-23

## 2023-05-23 RX ORDER — MELOXICAM 15 MG/1
15 TABLET ORAL
Qty: 30 | Refills: 1 | Status: COMPLETED | COMMUNITY
End: 2023-05-23

## 2023-05-23 RX ORDER — PHENAZOPYRIDINE 200 MG/1
200 TABLET, FILM COATED ORAL 3 TIMES DAILY
Qty: 9 | Refills: 0 | Status: COMPLETED | COMMUNITY
Start: 2022-08-15 | End: 2023-05-23

## 2023-05-23 RX ORDER — METHOCARBAMOL 500 MG/1
500 TABLET, FILM COATED ORAL 4 TIMES DAILY
Qty: 120 | Refills: 0 | Status: COMPLETED | COMMUNITY
Start: 2021-11-19 | End: 2023-05-23

## 2023-05-23 NOTE — IMAGING
[de-identified] : (Physical Exam: Lumbar Spine)\par \par Laterality is bilateral\par \par Patient is in no acute distress, alert and oriented\par Capillary refill is less than 2 seconds in the toes\par Lymphadenopathy is not present\par Peripheral edema is not present\par \par Skin is intact \par Swelling is not present \par Atrophy is not present\par Antalgic gait is not present \par \par Bony tenderness is not present \par Paraspinal tenderness is present \par Hip tenderness is not present\par Bony stepoff is not present\par \par Range of motion is limited\par \par Hip flexion strength is 5/5\par Hip abduction strength is 5/5\par Knee extension strength is 5/5\par Knee flexion strength is 5/5\par Ankle dorsiflexion strength is 5/5\par Ankle plantarflexion strength is 5/5\par Great toe dorsiflexion strength is 5/5\par Great toe plantarflexion strength is 5/5\par \par L3 sensation is intact\par L4 sensation is intact\par L5 sensation is intact\par S1 sensation is intact\par \par Patellar reflex is 2+\par Ankle jerk reflex is 2+\par \par Clonus is not present\par Aparicio's test is not present \par Straight leg raise is normal\par  [Straightening consistent with spasm] : Straightening consistent with spasm [Facet arthropathy] : Facet arthropathy [Disc space narrowing] : Disc space narrowing

## 2023-05-23 NOTE — HISTORY OF PRESENT ILLNESS
[FreeTextEntry1] : med refills\par back pain [de-identified] : Patient presents for routine med refill\par is compliant with medication, still taking prednisone 5mg.\par Prostate ca\par eating extra cookies at night\par Back pain has been getting worse, he was previously advised to have surgery, but does not want  surgery at this time. He is requesting injection in his back.

## 2023-05-23 NOTE — PHYSICAL EXAM
[No CVA Tenderness] : no CVA  tenderness [No Joint Swelling] : no joint swelling [Normal] : affect was normal and insight and judgment were intact

## 2023-05-23 NOTE — PLAN
[FreeTextEntry1] : Dm2- a1c ordered, improved last visit at 8.2. taking farxiga 5mg glipizide 5mg \par Metformin 1000mg dialy, actos 30mg dialy and trulicity 3mg weekly. Medications reviewed and renewed.\par  \par Back pain- declined xray or imaging at this time. has had mri in the past. prescribed norco 5mg, referral to pain management given. \par \par f/u in 3 months

## 2023-05-23 NOTE — HISTORY OF PRESENT ILLNESS
[de-identified] : (History of Present Illness)\par \par Patient age in years is 67\par Occupation is \par \par Body part causing symptoms is the lumbar spine \par Symptoms began over a year ago, worse the past 3 months\par Location of pain is bilateral \par Quality of pain is dull \par Pain score at rest is 10/10\par Pain score during activity is 10/10\par Radicular symptoms are not present\par Prior treatments include prednisone, meloxicam, icy hot and PT last year without relief\par His PMD sent him percocet, has not started yet\par Patient's condition is not associated with worker’s compensation, no-fault or interscholastic athletics\par

## 2023-05-23 NOTE — DISCUSSION/SUMMARY
[de-identified] : (Assessment and Plan)\par \par History, clinical examination and imaging were most consistent with:\par -lumbar degenerative disk disease\par \par The diagnosis was explained in detail. The potential non-surgical and surgical treatments were reviewed. The relative risks and benefits of each option were considered relative to the patient’s age, activity level, medical history, symptom severity and previously attempted treatments. \par \par The patient was advised to consult with their primary medical provider prior to initiation of any new medications to reduce the risk of adverse effects specific to their long-term home medications and medical history. The risk of gastrointestinal irritation and kidney injury specific to long-term NSAID use was discussed.   \par \par \par -Patient defers PT and will proceed with a home exercise program. \par -Over the counter NSAID as needed for pain. \par -Flexeril as needed for spasms. Patient advised to avoid percocet with the flexeril due to sedative effects. \par -The patient has persistent symptoms despite a trial of non-surgical treatment. An MRI was therefore ordered to evaluate for structural abnormality and further guide treatment recommendations.\par -Patient provided with pain management referral to discuss injection after his MRI is completed.\par -Patient counseled to go to ER for worsening pain, numbness, weakness, bowel/bladder dysfunction.\par -Follow up in 6 weeks as needed. We would refer to spine specialist at that time. \par \par (MDM) \par \par Problem Complexity\par -Moderate: chronic illness with exacerbation\par \par Risk\par -Moderate: prescription medication\par \par -Patient has not been seen by another provider in my practice within the past 2 years who specializes in orthopedic sports medicine, shoulder or elbow surgery. \par \par

## 2023-05-24 ENCOUNTER — FORM ENCOUNTER (OUTPATIENT)
Age: 68
End: 2023-05-24

## 2023-05-24 LAB
ESTIMATED AVERAGE GLUCOSE: 189 MG/DL
HBA1C MFR BLD HPLC: 8.2 %

## 2023-05-31 ENCOUNTER — FORM ENCOUNTER (OUTPATIENT)
Age: 68
End: 2023-05-31

## 2023-06-06 ENCOUNTER — APPOINTMENT (OUTPATIENT)
Dept: MRI IMAGING | Facility: CLINIC | Age: 68
End: 2023-06-06

## 2023-06-18 ENCOUNTER — FORM ENCOUNTER (OUTPATIENT)
Age: 68
End: 2023-06-18

## 2023-06-19 ENCOUNTER — APPOINTMENT (OUTPATIENT)
Dept: MRI IMAGING | Facility: CLINIC | Age: 68
End: 2023-06-19
Payer: MEDICARE

## 2023-06-19 ENCOUNTER — RESULT REVIEW (OUTPATIENT)
Age: 68
End: 2023-06-19

## 2023-06-19 PROCEDURE — 72148 MRI LUMBAR SPINE W/O DYE: CPT | Mod: MH

## 2023-06-20 ENCOUNTER — NON-APPOINTMENT (OUTPATIENT)
Age: 68
End: 2023-06-20

## 2023-07-09 ENCOUNTER — OUTPATIENT (OUTPATIENT)
Dept: OUTPATIENT SERVICES | Facility: HOSPITAL | Age: 68
LOS: 1 days | End: 2023-07-09
Payer: MEDICARE

## 2023-07-09 DIAGNOSIS — C61 MALIGNANT NEOPLASM OF PROSTATE: ICD-10-CM

## 2023-07-13 ENCOUNTER — APPOINTMENT (OUTPATIENT)
Dept: HEMATOLOGY ONCOLOGY | Facility: CLINIC | Age: 68
End: 2023-07-13

## 2023-07-14 ENCOUNTER — APPOINTMENT (OUTPATIENT)
Dept: FAMILY MEDICINE | Facility: CLINIC | Age: 68
End: 2023-07-14
Payer: MEDICARE

## 2023-07-14 VITALS
TEMPERATURE: 97.9 F | OXYGEN SATURATION: 99 % | BODY MASS INDEX: 36.99 KG/M2 | RESPIRATION RATE: 16 BRPM | SYSTOLIC BLOOD PRESSURE: 140 MMHG | WEIGHT: 222 LBS | HEIGHT: 65 IN | HEART RATE: 79 BPM | DIASTOLIC BLOOD PRESSURE: 80 MMHG

## 2023-07-14 PROCEDURE — 36415 COLL VENOUS BLD VENIPUNCTURE: CPT

## 2023-07-14 PROCEDURE — 99214 OFFICE O/P EST MOD 30 MIN: CPT | Mod: 25

## 2023-07-14 NOTE — HEALTH RISK ASSESSMENT
[0] : 2) Feeling down, depressed, or hopeless: Not at all (0) [PHQ-2 Negative - No further assessment needed] : PHQ-2 Negative - No further assessment needed [[id number]] : 0 [Never] : Never

## 2023-07-14 NOTE — HISTORY OF PRESENT ILLNESS
[FreeTextEntry1] : elevated blood sugar [de-identified] : Patient with history of Dm2, Hyperlipidemia, OA of spine and is currently undergoing prostate CA treatment. He is on prednisone 5mg daily. His last A1c was at 8.2. He was evaluated by ortho for his back pain and is planning to get "steroid shots in his back". Due to his BG being elevated more than once he was referred back to primary to decrease BG so that he may receive treatment.

## 2023-07-14 NOTE — PLAN
[FreeTextEntry1] : Dm2- last a1c at 8.2, discussed patients medications- metformin 1000mg BID, actose 30mg daily. glipizide er 5mg daily, trulicity 3mg weekly. Increased glipizide er to 10mg daily. Follow up in 1 month for a1c recheck. \par \par fasting labs ordered.

## 2023-07-17 LAB
ALBUMIN SERPL ELPH-MCNC: 4.5 G/DL
ALP BLD-CCNC: 58 U/L
ALT SERPL-CCNC: 28 U/L
ANION GAP SERPL CALC-SCNC: 15 MMOL/L
AST SERPL-CCNC: 20 U/L
BASOPHILS # BLD AUTO: 0.02 K/UL
BASOPHILS NFR BLD AUTO: 0.4 %
BILIRUB SERPL-MCNC: 0.5 MG/DL
BUN SERPL-MCNC: 16 MG/DL
CALCIUM SERPL-MCNC: 9.9 MG/DL
CHLORIDE SERPL-SCNC: 98 MMOL/L
CHOLEST SERPL-MCNC: 146 MG/DL
CO2 SERPL-SCNC: 21 MMOL/L
CREAT SERPL-MCNC: 0.85 MG/DL
EGFR: 96 ML/MIN/1.73M2
EOSINOPHIL # BLD AUTO: 0.05 K/UL
EOSINOPHIL NFR BLD AUTO: 1.1 %
ESTIMATED AVERAGE GLUCOSE: 194 MG/DL
GLUCOSE SERPL-MCNC: 210 MG/DL
HBA1C MFR BLD HPLC: 8.4 %
HCT VFR BLD CALC: 31.6 %
HDLC SERPL-MCNC: 56 MG/DL
HGB BLD-MCNC: 10.1 G/DL
IMM GRANULOCYTES NFR BLD AUTO: 0.7 %
LDLC SERPL CALC-MCNC: 79 MG/DL
LYMPHOCYTES # BLD AUTO: 0.67 K/UL
LYMPHOCYTES NFR BLD AUTO: 14.8 %
MAN DIFF?: NORMAL
MCHC RBC-ENTMCNC: 30.6 PG
MCHC RBC-ENTMCNC: 32 GM/DL
MCV RBC AUTO: 95.8 FL
MONOCYTES # BLD AUTO: 0.69 K/UL
MONOCYTES NFR BLD AUTO: 15.2 %
NEUTROPHILS # BLD AUTO: 3.07 K/UL
NEUTROPHILS NFR BLD AUTO: 67.8 %
NONHDLC SERPL-MCNC: 90 MG/DL
PLATELET # BLD AUTO: 179 K/UL
POTASSIUM SERPL-SCNC: 4.6 MMOL/L
PROT SERPL-MCNC: 7.2 G/DL
RBC # BLD: 3.3 M/UL
RBC # FLD: 12.8 %
SODIUM SERPL-SCNC: 134 MMOL/L
TRIGL SERPL-MCNC: 54 MG/DL
WBC # FLD AUTO: 4.53 K/UL

## 2023-07-19 ENCOUNTER — RESULT REVIEW (OUTPATIENT)
Age: 68
End: 2023-07-19

## 2023-07-19 PROCEDURE — 85027 COMPLETE CBC AUTOMATED: CPT

## 2023-07-20 ENCOUNTER — APPOINTMENT (OUTPATIENT)
Dept: HEMATOLOGY ONCOLOGY | Facility: CLINIC | Age: 68
End: 2023-07-20
Payer: MEDICARE

## 2023-07-20 VITALS
SYSTOLIC BLOOD PRESSURE: 119 MMHG | OXYGEN SATURATION: 99 % | TEMPERATURE: 97.1 F | HEART RATE: 80 BPM | HEIGHT: 65 IN | BODY MASS INDEX: 36.99 KG/M2 | WEIGHT: 222 LBS | DIASTOLIC BLOOD PRESSURE: 77 MMHG

## 2023-07-20 DIAGNOSIS — Z78.9 OTHER SPECIFIED HEALTH STATUS: ICD-10-CM

## 2023-07-20 LAB
BASOPHILS # BLD AUTO: 0.02 K/UL — SIGNIFICANT CHANGE UP (ref 0–0.2)
BASOPHILS NFR BLD AUTO: 0.4 % — SIGNIFICANT CHANGE UP (ref 0–2)
EOSINOPHIL # BLD AUTO: 0.08 K/UL — SIGNIFICANT CHANGE UP (ref 0–0.5)
EOSINOPHIL NFR BLD AUTO: 1.8 % — SIGNIFICANT CHANGE UP (ref 0–6)
HCT VFR BLD CALC: 31.1 % — LOW (ref 39–50)
HGB BLD-MCNC: 10 G/DL — LOW (ref 13–17)
IMM GRANULOCYTES NFR BLD AUTO: 1.1 % — HIGH (ref 0–0.9)
LYMPHOCYTES # BLD AUTO: 0.57 K/UL — LOW (ref 1–3.3)
LYMPHOCYTES # BLD AUTO: 12.5 % — LOW (ref 13–44)
MCHC RBC-ENTMCNC: 30.6 PG — SIGNIFICANT CHANGE UP (ref 27–34)
MCHC RBC-ENTMCNC: 32.2 GM/DL — SIGNIFICANT CHANGE UP (ref 32–36)
MCV RBC AUTO: 95.1 FL — SIGNIFICANT CHANGE UP (ref 80–100)
MONOCYTES # BLD AUTO: 0.54 K/UL — SIGNIFICANT CHANGE UP (ref 0–0.9)
MONOCYTES NFR BLD AUTO: 11.8 % — SIGNIFICANT CHANGE UP (ref 2–14)
NEUTROPHILS # BLD AUTO: 3.31 K/UL — SIGNIFICANT CHANGE UP (ref 1.8–7.4)
NEUTROPHILS NFR BLD AUTO: 72.4 % — SIGNIFICANT CHANGE UP (ref 43–77)
NRBC # BLD: 0 /100 WBCS — SIGNIFICANT CHANGE UP (ref 0–0)
PLATELET # BLD AUTO: 175 K/UL — SIGNIFICANT CHANGE UP (ref 150–400)
RBC # BLD: 3.27 M/UL — LOW (ref 4.2–5.8)
RBC # FLD: 12.3 % — SIGNIFICANT CHANGE UP (ref 10.3–14.5)
WBC # BLD: 4.57 K/UL — SIGNIFICANT CHANGE UP (ref 3.8–10.5)
WBC # FLD AUTO: 4.57 K/UL — SIGNIFICANT CHANGE UP (ref 3.8–10.5)

## 2023-07-20 PROCEDURE — 99214 OFFICE O/P EST MOD 30 MIN: CPT

## 2023-07-20 NOTE — RESULTS/DATA
[FreeTextEntry1] : Mr. Washington initially presented at age 66 in May 2022 for evaluation of locally recurrent prostate adenocarcinoma. \par The patient has a medical history of HTN, DM.\par \par S/p salvage RT for locally recurrent prostate adenocarcinoma with Dr. Caballero. \par Initiated abiraterone 8/2022, tolerating this well. \par Continue Lupron injections with Dr. Olguin q 12 weeks - last given 6/2023\par Dysuria and consipation resolved. \par RTC 3 months w/ Dr. Small, sooner prn\par Following with PCP for elevated glucose levels\par \par

## 2023-07-20 NOTE — PHYSICAL EXAM
[Fully active, able to carry on all pre-disease performance without restriction] : Status 0 - Fully active, able to carry on all pre-disease performance without restriction [Normal] : affect appropriate [de-identified] : generally well appearing male, NAD, pleasant

## 2023-07-20 NOTE — HISTORY OF PRESENT ILLNESS
[de-identified] : Gildardo presents for follow up on 7/20/23 for prostate cancer. \par Isaiah 6/2023 - Dr. Olguin \par \par Testicular US 10/31/22 reviewed: mildly distended urinary bladder, grossly unremarkable pelvic US\par Diffuse scrotal skin thickening, probably testicular atrophic change and microlithiasis\par 1cm sized hypoechoic structure between the R epididymis and testes, indeterminate\par UA negative for UTI \par \par At today's visit he is feeling generally well except for exacerbation of chronic low back back.  Following w/ PCP.  Was planned for spinal injection which is now on hold due to elevated glucose.  He is working on diet, titrating DM meds as per PCP.  Following with Dr. Olguin - Lupron given 6/23.  He continues abiraterone (4 tabs q am) and prednisone.  Has hot flashes daily, these don’t interfere with his life.  Burning w/ urination improved.  Energy level stable.  He denies fatigue, fevers, chills, CP, SOB, n/v, LE swelling. \par \par Laboratory studies reviewed at today's visit and notable for: WBC 4.57, Hb 10.0, Plt 175\par  [de-identified] : Referred by: Dr. Linda Caballero\par Diagnosis: Locally recurrent prostate cancer\par \par Mr. Washington initially presented at age 66 in May 2022 for evaluation of locally recurrent prostate adenocarcinoma. \par The patient has a medical history of HTN, DM.\par \par Presenting HPI: Gildardo was referred for consultation for locally recurrent prostate cancer. He reported a history of prostate cancer and initially underwent radical prostatectomy by his urologist about 15 years ago, denied any adjuvant therapy at that time.  He was subsequently lost to follow-up; more recently, his PSA testing by his primary care physician showed an elevated PSA to 11. He underwent an MRI of the prostate which showed likely prostate bed recurrence of the prostatectomy bed.  There was a visible abnormality measuring 21 x 15 mm that involve the bladder neck and encases the proximal urethra.  He underwent biopsy of the lesion which confirmed Bangs 4+3 carcinoma. PSMA PET CT was performed on 4/28/22 which initially was read as negative however upon further review showed focal PSMA uptake in the prostate bed as well as bilateral breast uptake likely correlating with gynecomastia.  He was initiated on bicalutamide and received his first lupron injection by urologist Dr. Olguin on Thursday 5/5/22. He was pending salvage radiation treatment with Dr. Caballero and was referred to medical oncology for consideration of additional systemic therapies.\par \par He reported that he was feeling generally very well.  He denied any urinary complaints at this time.  He had a good appetite and his weight is stable.  He also reported a good energy level.  He presented with 2 of his sisters who he is very close with.\par \par PSA 4/8/22: 11.32\par PSA 11/19/21: 11 \par \par Imaging:\par PSMA PET CT 4/28/22: There is subtle focal PSMA uptake in the prostate surgical resection site in the right posterior lateral region adjacent to the ureter with an SUV max 11.6, 1.1 x 0.8 cm.  It corresponds to the 2.1 x 1.5 cm T2 hypointense lesion described on most recent prostate MRI.  It is adjacent to the distended urinary bladder making it difficult to distinguish from the overlying PSMA avid urine.  No evidence of PSMA avid adenopathy in the pelvis abdomen or above the diaphragm to suggest edinson mets.  There is some focal increased PSMA uptake in the left breast greater than right breast.  No evidence of PSMA avid metastatic disease in the bones.\par \par MRI prostate 3/3/22 - 2.1 x 1.5cm PI-RADS 5 lesion wrapping around the proximal urethra, anterior R lateral posterior with some tumor extending posterior superiorly behind the bladder neck and R seminal vesicle\par \par Path: \par Prostate lesion biopsy 3/28/22: Adenocarcinoma with extension into the muscle, laura 4+3=7, grade group 3, 80% involvement, measuring 12mm, PNI+\par Core path reviewed (8/3/22) Prostate adenocarcinoma, grade group 5 (laura 4+5-9), PNI+, Initial path revealed laura 4+3=7\par \par Genetics: not done \par \par HCM: \par - COVID vaccination: s/p 2 doses \par - Colonoscopy: never done \par \par SH: \par - Occupation: on disability \par - Living situation: lives in [place], does not have any children, he has a strong family support system with several sisters  \par - Smoking/etoh/illicits: never smoker, drinks at holidays, denies ilicits \par - Exercise: minimally active \par \par FH: \par - His sister had breast cancer around age 60

## 2023-07-21 LAB
ALBUMIN SERPL ELPH-MCNC: 4.4 G/DL
ALP BLD-CCNC: 58 U/L
ALT SERPL-CCNC: 24 U/L
ANION GAP SERPL CALC-SCNC: 15 MMOL/L
AST SERPL-CCNC: 18 U/L
BILIRUB SERPL-MCNC: 0.4 MG/DL
BUN SERPL-MCNC: 16 MG/DL
CALCIUM SERPL-MCNC: 9.8 MG/DL
CHLORIDE SERPL-SCNC: 98 MMOL/L
CO2 SERPL-SCNC: 19 MMOL/L
CREAT SERPL-MCNC: 0.84 MG/DL
EGFR: 97 ML/MIN/1.73M2
FERRITIN SERPL-MCNC: 518 NG/ML
FOLATE SERPL-MCNC: 9.8 NG/ML
GLUCOSE SERPL-MCNC: 289 MG/DL
IRON SATN MFR SERPL: 29 %
IRON SERPL-MCNC: 104 UG/DL
POTASSIUM SERPL-SCNC: 5.1 MMOL/L
PROT SERPL-MCNC: 7.1 G/DL
PSA FREE FLD-MCNC: NORMAL %
PSA FREE SERPL-MCNC: <0.01 NG/ML
PSA SERPL-MCNC: <0.01 NG/ML
SODIUM SERPL-SCNC: 132 MMOL/L
TIBC SERPL-MCNC: 357 UG/DL
UIBC SERPL-MCNC: 253 UG/DL
VIT B12 SERPL-MCNC: 275 PG/ML

## 2023-07-25 LAB
TESTOST FREE SERPL-MCNC: 0.1 PG/ML
TESTOST SERPL-MCNC: <2.5 NG/DL

## 2023-08-15 ENCOUNTER — APPOINTMENT (OUTPATIENT)
Dept: FAMILY MEDICINE | Facility: CLINIC | Age: 68
End: 2023-08-15
Payer: MEDICARE

## 2023-08-15 VITALS
TEMPERATURE: 97.2 F | HEIGHT: 65 IN | DIASTOLIC BLOOD PRESSURE: 76 MMHG | BODY MASS INDEX: 47.5 KG/M2 | HEART RATE: 101 BPM | WEIGHT: 285.13 LBS | SYSTOLIC BLOOD PRESSURE: 129 MMHG | OXYGEN SATURATION: 98 %

## 2023-08-15 PROCEDURE — 36415 COLL VENOUS BLD VENIPUNCTURE: CPT

## 2023-08-15 PROCEDURE — 99213 OFFICE O/P EST LOW 20 MIN: CPT | Mod: 25

## 2023-08-15 NOTE — HEALTH RISK ASSESSMENT
[0] : 2) Feeling down, depressed, or hopeless: Not at all (0) [PHQ-2 Negative - No further assessment needed] : PHQ-2 Negative - No further assessment needed [[id number]] : 0

## 2023-08-15 NOTE — PLAN
[FreeTextEntry1] : Diabetes mellitus- 1 month follow up, A1C drawn today. Patient will call when he needs refills.   Follow up in 3 months.

## 2023-08-15 NOTE — HISTORY OF PRESENT ILLNESS
[FreeTextEntry1] : 1 month follow up. a1c recheck [de-identified] : Patient with a history of prostate ca, presents for 1 month recheck after increasing his glipizide er to 10mg. A1C was at 8.4 at this time. He is on prednisone 5mg daily. He needs to get his BG levels down to have his cortisol shot in his back. He denies acute concerns or complaints since increasing dose.

## 2023-08-18 LAB
ESTIMATED AVERAGE GLUCOSE: 160 MG/DL
HBA1C MFR BLD HPLC: 7.2 %

## 2023-08-21 ENCOUNTER — RX RENEWAL (OUTPATIENT)
Age: 68
End: 2023-08-21

## 2023-08-23 RX ORDER — BLOOD-GLUCOSE METER
W/DEVICE KIT MISCELLANEOUS
Qty: 1 | Refills: 0 | Status: ACTIVE | COMMUNITY
Start: 2023-08-23 | End: 1900-01-01

## 2023-09-06 ENCOUNTER — RX RENEWAL (OUTPATIENT)
Age: 68
End: 2023-09-06

## 2023-10-10 ENCOUNTER — RX RENEWAL (OUTPATIENT)
Age: 68
End: 2023-10-10

## 2023-10-12 ENCOUNTER — OUTPATIENT (OUTPATIENT)
Dept: OUTPATIENT SERVICES | Facility: HOSPITAL | Age: 68
LOS: 1 days | End: 2023-10-12
Payer: MEDICARE

## 2023-10-12 DIAGNOSIS — C61 MALIGNANT NEOPLASM OF PROSTATE: ICD-10-CM

## 2023-10-20 ENCOUNTER — RESULT REVIEW (OUTPATIENT)
Age: 68
End: 2023-10-20

## 2023-10-20 ENCOUNTER — APPOINTMENT (OUTPATIENT)
Dept: HEMATOLOGY ONCOLOGY | Facility: CLINIC | Age: 68
End: 2023-10-20
Payer: MEDICARE

## 2023-10-20 VITALS
HEIGHT: 65 IN | SYSTOLIC BLOOD PRESSURE: 123 MMHG | HEART RATE: 121 BPM | WEIGHT: 280 LBS | BODY MASS INDEX: 46.65 KG/M2 | TEMPERATURE: 98.2 F | DIASTOLIC BLOOD PRESSURE: 75 MMHG | OXYGEN SATURATION: 98 %

## 2023-10-20 LAB
BASOPHILS # BLD AUTO: 0.02 K/UL — SIGNIFICANT CHANGE UP (ref 0–0.2)
BASOPHILS # BLD AUTO: 0.02 K/UL — SIGNIFICANT CHANGE UP (ref 0–0.2)
BASOPHILS NFR BLD AUTO: 0.5 % — SIGNIFICANT CHANGE UP (ref 0–2)
BASOPHILS NFR BLD AUTO: 0.5 % — SIGNIFICANT CHANGE UP (ref 0–2)
EOSINOPHIL # BLD AUTO: 0.06 K/UL — SIGNIFICANT CHANGE UP (ref 0–0.5)
EOSINOPHIL # BLD AUTO: 0.06 K/UL — SIGNIFICANT CHANGE UP (ref 0–0.5)
EOSINOPHIL NFR BLD AUTO: 1.4 % — SIGNIFICANT CHANGE UP (ref 0–6)
EOSINOPHIL NFR BLD AUTO: 1.4 % — SIGNIFICANT CHANGE UP (ref 0–6)
HCT VFR BLD CALC: 29.7 % — LOW (ref 39–50)
HCT VFR BLD CALC: 29.7 % — LOW (ref 39–50)
HGB BLD-MCNC: 9.6 G/DL — LOW (ref 13–17)
HGB BLD-MCNC: 9.6 G/DL — LOW (ref 13–17)
IMM GRANULOCYTES NFR BLD AUTO: 0.5 % — SIGNIFICANT CHANGE UP (ref 0–0.9)
IMM GRANULOCYTES NFR BLD AUTO: 0.5 % — SIGNIFICANT CHANGE UP (ref 0–0.9)
LYMPHOCYTES # BLD AUTO: 0.92 K/UL — LOW (ref 1–3.3)
LYMPHOCYTES # BLD AUTO: 0.92 K/UL — LOW (ref 1–3.3)
LYMPHOCYTES # BLD AUTO: 21 % — SIGNIFICANT CHANGE UP (ref 13–44)
LYMPHOCYTES # BLD AUTO: 21 % — SIGNIFICANT CHANGE UP (ref 13–44)
MCHC RBC-ENTMCNC: 30.7 PG — SIGNIFICANT CHANGE UP (ref 27–34)
MCHC RBC-ENTMCNC: 30.7 PG — SIGNIFICANT CHANGE UP (ref 27–34)
MCHC RBC-ENTMCNC: 32.3 GM/DL — SIGNIFICANT CHANGE UP (ref 32–36)
MCHC RBC-ENTMCNC: 32.3 GM/DL — SIGNIFICANT CHANGE UP (ref 32–36)
MCV RBC AUTO: 94.9 FL — SIGNIFICANT CHANGE UP (ref 80–100)
MCV RBC AUTO: 94.9 FL — SIGNIFICANT CHANGE UP (ref 80–100)
MONOCYTES # BLD AUTO: 0.51 K/UL — SIGNIFICANT CHANGE UP (ref 0–0.9)
MONOCYTES # BLD AUTO: 0.51 K/UL — SIGNIFICANT CHANGE UP (ref 0–0.9)
MONOCYTES NFR BLD AUTO: 11.6 % — SIGNIFICANT CHANGE UP (ref 2–14)
MONOCYTES NFR BLD AUTO: 11.6 % — SIGNIFICANT CHANGE UP (ref 2–14)
NEUTROPHILS # BLD AUTO: 2.86 K/UL — SIGNIFICANT CHANGE UP (ref 1.8–7.4)
NEUTROPHILS # BLD AUTO: 2.86 K/UL — SIGNIFICANT CHANGE UP (ref 1.8–7.4)
NEUTROPHILS NFR BLD AUTO: 65 % — SIGNIFICANT CHANGE UP (ref 43–77)
NEUTROPHILS NFR BLD AUTO: 65 % — SIGNIFICANT CHANGE UP (ref 43–77)
NRBC # BLD: 0 /100 WBCS — SIGNIFICANT CHANGE UP (ref 0–0)
NRBC # BLD: 0 /100 WBCS — SIGNIFICANT CHANGE UP (ref 0–0)
PLATELET # BLD AUTO: 178 K/UL — SIGNIFICANT CHANGE UP (ref 150–400)
PLATELET # BLD AUTO: 178 K/UL — SIGNIFICANT CHANGE UP (ref 150–400)
RBC # BLD: 3.13 M/UL — LOW (ref 4.2–5.8)
RBC # BLD: 3.13 M/UL — LOW (ref 4.2–5.8)
RBC # FLD: 12.5 % — SIGNIFICANT CHANGE UP (ref 10.3–14.5)
RBC # FLD: 12.5 % — SIGNIFICANT CHANGE UP (ref 10.3–14.5)
WBC # BLD: 4.39 K/UL — SIGNIFICANT CHANGE UP (ref 3.8–10.5)
WBC # BLD: 4.39 K/UL — SIGNIFICANT CHANGE UP (ref 3.8–10.5)
WBC # FLD AUTO: 4.39 K/UL — SIGNIFICANT CHANGE UP (ref 3.8–10.5)
WBC # FLD AUTO: 4.39 K/UL — SIGNIFICANT CHANGE UP (ref 3.8–10.5)

## 2023-10-20 PROCEDURE — 85027 COMPLETE CBC AUTOMATED: CPT

## 2023-10-20 PROCEDURE — 99214 OFFICE O/P EST MOD 30 MIN: CPT

## 2023-10-23 LAB
ALBUMIN SERPL ELPH-MCNC: 4.4 G/DL
ALP BLD-CCNC: 53 U/L
ALT SERPL-CCNC: 19 U/L
ANION GAP SERPL CALC-SCNC: 13 MMOL/L
AST SERPL-CCNC: 18 U/L
BILIRUB SERPL-MCNC: 0.4 MG/DL
BUN SERPL-MCNC: 14 MG/DL
CALCIUM SERPL-MCNC: 10 MG/DL
CHLORIDE SERPL-SCNC: 99 MMOL/L
CO2 SERPL-SCNC: 21 MMOL/L
CREAT SERPL-MCNC: 0.8 MG/DL
CRP SERPL-MCNC: <3 MG/L
EGFR: 98 ML/MIN/1.73M2
ERYTHROCYTE [SEDIMENTATION RATE] IN BLOOD BY WESTERGREN METHOD: 41 MM/HR
FERRITIN SERPL-MCNC: 420 NG/ML
FOLATE SERPL-MCNC: 9.6 NG/ML
GLUCOSE SERPL-MCNC: 180 MG/DL
IRON SATN MFR SERPL: 28 %
IRON SERPL-MCNC: 98 UG/DL
POTASSIUM SERPL-SCNC: 4.4 MMOL/L
PROT SERPL-MCNC: 7.1 G/DL
PSA FREE FLD-MCNC: NORMAL %
PSA FREE SERPL-MCNC: <0.01 NG/ML
PSA SERPL-MCNC: <0.01 NG/ML
SODIUM SERPL-SCNC: 133 MMOL/L
TIBC SERPL-MCNC: 353 UG/DL
UIBC SERPL-MCNC: 256 UG/DL
VIT B12 SERPL-MCNC: 240 PG/ML

## 2023-10-24 ENCOUNTER — NON-APPOINTMENT (OUTPATIENT)
Age: 68
End: 2023-10-24

## 2023-12-07 ENCOUNTER — APPOINTMENT (OUTPATIENT)
Dept: FAMILY MEDICINE | Facility: CLINIC | Age: 68
End: 2023-12-07
Payer: MEDICARE

## 2023-12-07 VITALS
HEIGHT: 65 IN | HEART RATE: 92 BPM | OXYGEN SATURATION: 99 % | TEMPERATURE: 97.3 F | WEIGHT: 280 LBS | SYSTOLIC BLOOD PRESSURE: 128 MMHG | DIASTOLIC BLOOD PRESSURE: 77 MMHG | BODY MASS INDEX: 46.65 KG/M2

## 2023-12-07 DIAGNOSIS — C61 MALIGNANT NEOPLASM OF PROSTATE: ICD-10-CM

## 2023-12-07 DIAGNOSIS — M54.50 LOW BACK PAIN, UNSPECIFIED: ICD-10-CM

## 2023-12-07 PROCEDURE — 99214 OFFICE O/P EST MOD 30 MIN: CPT

## 2023-12-07 RX ORDER — ABIRATERONE ACETATE 250 MG/1
250 TABLET, FILM COATED ORAL
Qty: 120 | Refills: 5 | Status: DISCONTINUED | COMMUNITY
Start: 2022-08-22 | End: 2023-12-07

## 2023-12-07 RX ORDER — PREDNISONE 5 MG/1
5 TABLET ORAL
Qty: 90 | Refills: 3 | Status: DISCONTINUED | COMMUNITY
Start: 2022-08-22 | End: 2023-12-07

## 2023-12-07 RX ORDER — ASPIRIN ENTERIC COATED TABLETS 81 MG 81 MG/1
81 TABLET, DELAYED RELEASE ORAL DAILY
Qty: 100 | Refills: 0 | Status: ACTIVE | COMMUNITY
Start: 1900-01-01 | End: 1900-01-01

## 2023-12-28 ENCOUNTER — OUTPATIENT (OUTPATIENT)
Dept: OUTPATIENT SERVICES | Facility: HOSPITAL | Age: 68
LOS: 1 days | End: 2023-12-28
Payer: MEDICARE

## 2023-12-28 DIAGNOSIS — C61 MALIGNANT NEOPLASM OF PROSTATE: ICD-10-CM

## 2024-01-04 ENCOUNTER — APPOINTMENT (OUTPATIENT)
Dept: HEMATOLOGY ONCOLOGY | Facility: CLINIC | Age: 69
End: 2024-01-04
Payer: MEDICARE

## 2024-01-04 ENCOUNTER — RESULT REVIEW (OUTPATIENT)
Age: 69
End: 2024-01-04

## 2024-01-04 VITALS
WEIGHT: 279 LBS | BODY MASS INDEX: 46.43 KG/M2 | DIASTOLIC BLOOD PRESSURE: 87 MMHG | SYSTOLIC BLOOD PRESSURE: 163 MMHG | HEART RATE: 99 BPM | OXYGEN SATURATION: 98 % | TEMPERATURE: 97.9 F

## 2024-01-04 VITALS — SYSTOLIC BLOOD PRESSURE: 138 MMHG | DIASTOLIC BLOOD PRESSURE: 78 MMHG

## 2024-01-04 LAB
BASOPHILS # BLD AUTO: 0.01 K/UL — SIGNIFICANT CHANGE UP (ref 0–0.2)
BASOPHILS # BLD AUTO: 0.01 K/UL — SIGNIFICANT CHANGE UP (ref 0–0.2)
BASOPHILS NFR BLD AUTO: 0.2 % — SIGNIFICANT CHANGE UP (ref 0–2)
BASOPHILS NFR BLD AUTO: 0.2 % — SIGNIFICANT CHANGE UP (ref 0–2)
EOSINOPHIL # BLD AUTO: 0.1 K/UL — SIGNIFICANT CHANGE UP (ref 0–0.5)
EOSINOPHIL # BLD AUTO: 0.1 K/UL — SIGNIFICANT CHANGE UP (ref 0–0.5)
EOSINOPHIL NFR BLD AUTO: 2.4 % — SIGNIFICANT CHANGE UP (ref 0–6)
EOSINOPHIL NFR BLD AUTO: 2.4 % — SIGNIFICANT CHANGE UP (ref 0–6)
HCT VFR BLD CALC: 31.6 % — LOW (ref 39–50)
HCT VFR BLD CALC: 31.6 % — LOW (ref 39–50)
HGB BLD-MCNC: 10.5 G/DL — LOW (ref 13–17)
HGB BLD-MCNC: 10.5 G/DL — LOW (ref 13–17)
IMM GRANULOCYTES NFR BLD AUTO: 0.7 % — SIGNIFICANT CHANGE UP (ref 0–0.9)
IMM GRANULOCYTES NFR BLD AUTO: 0.7 % — SIGNIFICANT CHANGE UP (ref 0–0.9)
LYMPHOCYTES # BLD AUTO: 0.89 K/UL — LOW (ref 1–3.3)
LYMPHOCYTES # BLD AUTO: 0.89 K/UL — LOW (ref 1–3.3)
LYMPHOCYTES # BLD AUTO: 21.7 % — SIGNIFICANT CHANGE UP (ref 13–44)
LYMPHOCYTES # BLD AUTO: 21.7 % — SIGNIFICANT CHANGE UP (ref 13–44)
MCHC RBC-ENTMCNC: 30.3 PG — SIGNIFICANT CHANGE UP (ref 27–34)
MCHC RBC-ENTMCNC: 30.3 PG — SIGNIFICANT CHANGE UP (ref 27–34)
MCHC RBC-ENTMCNC: 33.2 GM/DL — SIGNIFICANT CHANGE UP (ref 32–36)
MCHC RBC-ENTMCNC: 33.2 GM/DL — SIGNIFICANT CHANGE UP (ref 32–36)
MCV RBC AUTO: 91.1 FL — SIGNIFICANT CHANGE UP (ref 80–100)
MCV RBC AUTO: 91.1 FL — SIGNIFICANT CHANGE UP (ref 80–100)
MONOCYTES # BLD AUTO: 0.49 K/UL — SIGNIFICANT CHANGE UP (ref 0–0.9)
MONOCYTES # BLD AUTO: 0.49 K/UL — SIGNIFICANT CHANGE UP (ref 0–0.9)
MONOCYTES NFR BLD AUTO: 11.9 % — SIGNIFICANT CHANGE UP (ref 2–14)
MONOCYTES NFR BLD AUTO: 11.9 % — SIGNIFICANT CHANGE UP (ref 2–14)
NEUTROPHILS # BLD AUTO: 2.59 K/UL — SIGNIFICANT CHANGE UP (ref 1.8–7.4)
NEUTROPHILS # BLD AUTO: 2.59 K/UL — SIGNIFICANT CHANGE UP (ref 1.8–7.4)
NEUTROPHILS NFR BLD AUTO: 63.1 % — SIGNIFICANT CHANGE UP (ref 43–77)
NEUTROPHILS NFR BLD AUTO: 63.1 % — SIGNIFICANT CHANGE UP (ref 43–77)
NRBC # BLD: 0 /100 WBCS — SIGNIFICANT CHANGE UP (ref 0–0)
NRBC # BLD: 0 /100 WBCS — SIGNIFICANT CHANGE UP (ref 0–0)
PLATELET # BLD AUTO: 213 K/UL — SIGNIFICANT CHANGE UP (ref 150–400)
PLATELET # BLD AUTO: 213 K/UL — SIGNIFICANT CHANGE UP (ref 150–400)
RBC # BLD: 3.47 M/UL — LOW (ref 4.2–5.8)
RBC # BLD: 3.47 M/UL — LOW (ref 4.2–5.8)
RBC # FLD: 12.1 % — SIGNIFICANT CHANGE UP (ref 10.3–14.5)
RBC # FLD: 12.1 % — SIGNIFICANT CHANGE UP (ref 10.3–14.5)
WBC # BLD: 4.11 K/UL — SIGNIFICANT CHANGE UP (ref 3.8–10.5)
WBC # BLD: 4.11 K/UL — SIGNIFICANT CHANGE UP (ref 3.8–10.5)
WBC # FLD AUTO: 4.11 K/UL — SIGNIFICANT CHANGE UP (ref 3.8–10.5)
WBC # FLD AUTO: 4.11 K/UL — SIGNIFICANT CHANGE UP (ref 3.8–10.5)

## 2024-01-04 PROCEDURE — 99214 OFFICE O/P EST MOD 30 MIN: CPT

## 2024-01-04 PROCEDURE — 85027 COMPLETE CBC AUTOMATED: CPT

## 2024-01-08 LAB
ALBUMIN SERPL ELPH-MCNC: 4.5 G/DL
ALP BLD-CCNC: 54 U/L
ALT SERPL-CCNC: 19 U/L
ANION GAP SERPL CALC-SCNC: 14 MMOL/L
AST SERPL-CCNC: 15 U/L
BILIRUB SERPL-MCNC: 0.3 MG/DL
BUN SERPL-MCNC: 15 MG/DL
CALCIUM SERPL-MCNC: 9.8 MG/DL
CHLORIDE SERPL-SCNC: 95 MMOL/L
CO2 SERPL-SCNC: 22 MMOL/L
CREAT SERPL-MCNC: 0.84 MG/DL
EGFR: 96 ML/MIN/1.73M2
FERRITIN SERPL-MCNC: 445 NG/ML
FOLATE SERPL-MCNC: 9.9 NG/ML
GLUCOSE SERPL-MCNC: 254 MG/DL
IRON SATN MFR SERPL: 24 %
IRON SERPL-MCNC: 87 UG/DL
POTASSIUM SERPL-SCNC: 4.3 MMOL/L
PROT SERPL-MCNC: 7.5 G/DL
PSA FREE FLD-MCNC: NORMAL %
PSA FREE SERPL-MCNC: <0.01 NG/ML
PSA SERPL-MCNC: <0.01 NG/ML
SODIUM SERPL-SCNC: 131 MMOL/L
TIBC SERPL-MCNC: 361 UG/DL
UIBC SERPL-MCNC: 274 UG/DL
VIT B12 SERPL-MCNC: 341 PG/ML

## 2024-01-08 NOTE — RESULTS/DATA
[FreeTextEntry1] : Mr. Washington presented at age 66 in May 2022 for evaluation of locally recurrent prostate adenocarcinoma.  The patient has a medical history of HTN, DM.  S/p salvage RT for locally recurrent prostate adenocarcinoma with Dr. Caballero.  Dysuria and constipation resolved.  Initiated abiraterone 8/2022, tolerated well overall, discontinued d/t weight gain Switched to Xtandi in 11/2023 w/ good tolerance  Continue lupron injections with Dr. Olguin.  Reviewed testicular US w/ Dr. Olguin.  He is tolerating treatment well at this time- continue.  Will repeat anemia workup.  RTC 3 months to see Dr. Small 4/4/24 as scheduled. Trend PSA.  All patient questions answered at today's visit. Patient urged to call the office with any questions or concerns.

## 2024-01-08 NOTE — HISTORY OF PRESENT ILLNESS
[de-identified] : Referred by: Dr. Linda Caballero Diagnosis: Locally recurrent prostate cancer  Mr. Washnigton initially presented at age 66 in May 2022 for evaluation of locally recurrent prostate adenocarcinoma.  The patient has a medical history of HTN, DM.  Presenting HPI: Gildardo was referred for consultation for locally recurrent prostate cancer. He has a history of prostate cancer and initially underwent radical prostatectomy by his urologist about 15 years ago, denies any adjuvant therapy at that time.  He was subsequently lost to follow-up and recent PSA testing by his primary care physician showed an elevated PSA to 11. He underwent an MRI of the prostate which showed likely prostate bed recurrence of the prostatectomy bed.  There was a visible abnormality measuring 21 x 15 mm that involve the bladder neck and encases the proximal urethra.  He underwent biopsy of the lesion which confirmed Laura 4+3 carcinoma. PSMA PET CT was performed on 4/28/22 which initially was read as negative however upon further review showed focal PSMA uptake in the prostate bed as well as bilateral breast uptake likely correlating with gynecomastia.  He had been initiated on bicalutamide and received his first lupron injection by urologist Dr. Olguin on Thursday 5/5/22. He was pending salvage radiation treatment with Dr. Caballero and has been referred to medical oncology for consideration of additional systemic therapies.  He reported feeling generally very well.  He denied any urinary complaints at this time.  He had a good appetite and his weight is stable.  He also reported a good energy level.  He initially presented with 2 of his sisters who he is very close with.  PSA 4/8/22: 11.32 PSA 11/19/21: 11   Imaging: PSMA PET CT 4/28/22: There is subtle focal PSMA uptake in the prostate surgical resection site in the right posterior lateral region adjacent to the ureter with an SUV max 11.6, 1.1 x 0.8 cm.  It corresponds to the 2.1 x 1.5 cm T2 hypointense lesion described on most recent prostate MRI.  It is adjacent to the distended urinary bladder making it difficult to distinguish from the overlying PSMA avid urine.  No evidence of PSMA avid adenopathy in the pelvis abdomen or above the diaphragm to suggest edinson mets.  There is some focal increased PSMA uptake in the left breast greater than right breast.  No evidence of PSMA avid metastatic disease in the bones.  MRI prostate 3/3/22 - 2.1 x 1.5cm PI-RADS 5 lesion wrapping around the proximal urethra, anterior R lateral posterior with some tumor extending posterior superiorly behind the bladder neck and R seminal vesicle  Path:  Prostate lesion biopsy 3/28/22: Adenocarcinoma with extension into the muscle, laura 4+3=7, grade group 3, 80% involvement, measuring 12mm, PNI+ Core path reviewed (8/3/22) Prostate adenocarcinoma, grade group 5 (laura 4+5-9), PNI+, Initial path revealed laura 4+3=7  Genetics: not done   HCM:  - COVID vaccination: s/p 2 doses  - Colonoscopy: never done   SH:  - Occupation: on disability  - Living situation: lives in [place], does not have any children, he has a strong family support system with several sisters   - Smoking/etoh/illicits: never smoker, drinks at holidays, denies ilicits  - Exercise: minimally active   FH:  - His sister had breast cancer around age 60 [de-identified] : Gildardo presents for follow up on 1/4/24 for prostate cancer.  Remains on lupron q 3 months w/  Initiated abiraterone/prednisone 8/2022 -w/ plan for 2-3 years therapy --> discontinued 10/2023 d/t weight gain Started Xtandi 11/2023 (40 mg, 4 capsules daily)   At today's visit he is feeling very well.  Denies any further dysuria. He discontinued abiraterone and prednisone as advised in 10/2023 and began Xtandi due to weight gain.  Tolerating new regimen well.  He reports being UTD with lupron injections w/ Dr. Olguin . He denies fatigue, fevers, chills, CP, SOB, n/v, LE swelling, hot flashes.  Laboratory studies reviewed at today's visit and notable for: WBC 4.11, Hb 10.5, plt 213  PSA 7/20/23 & 10/2023 <0.01

## 2024-01-08 NOTE — PHYSICAL EXAM
[Fully active, able to carry on all pre-disease performance without restriction] : Status 0 - Fully active, able to carry on all pre-disease performance without restriction [Normal] : affect appropriate [de-identified] : generally well appearing male, NAD, pleasant

## 2024-01-10 LAB
TESTOST FREE SERPL-MCNC: 0.5 PG/ML
TESTOST SERPL-MCNC: <2.5 NG/DL

## 2024-02-20 ENCOUNTER — RX RENEWAL (OUTPATIENT)
Age: 69
End: 2024-02-20

## 2024-03-28 ENCOUNTER — OUTPATIENT (OUTPATIENT)
Dept: OUTPATIENT SERVICES | Facility: HOSPITAL | Age: 69
LOS: 1 days | End: 2024-03-28

## 2024-03-28 DIAGNOSIS — C61 MALIGNANT NEOPLASM OF PROSTATE: ICD-10-CM

## 2024-04-02 ENCOUNTER — APPOINTMENT (OUTPATIENT)
Dept: FAMILY MEDICINE | Facility: CLINIC | Age: 69
End: 2024-04-02
Payer: MEDICARE

## 2024-04-02 VITALS
TEMPERATURE: 97.1 F | SYSTOLIC BLOOD PRESSURE: 130 MMHG | HEIGHT: 65 IN | OXYGEN SATURATION: 98 % | BODY MASS INDEX: 46.15 KG/M2 | HEART RATE: 81 BPM | WEIGHT: 277 LBS | DIASTOLIC BLOOD PRESSURE: 75 MMHG

## 2024-04-02 DIAGNOSIS — M54.9 DORSALGIA, UNSPECIFIED: ICD-10-CM

## 2024-04-02 DIAGNOSIS — E11.9 TYPE 2 DIABETES MELLITUS W/OUT COMPLICATIONS: ICD-10-CM

## 2024-04-02 PROCEDURE — 36415 COLL VENOUS BLD VENIPUNCTURE: CPT

## 2024-04-02 PROCEDURE — 99213 OFFICE O/P EST LOW 20 MIN: CPT

## 2024-04-02 RX ORDER — ERGOCALCIFEROL 1.25 MG/1
1.25 MG CAPSULE ORAL
Qty: 13 | Refills: 0 | Status: ACTIVE | COMMUNITY
Start: 1900-01-01 | End: 1900-01-01

## 2024-04-02 NOTE — HISTORY OF PRESENT ILLNESS
[FreeTextEntry1] : med refills  [de-identified] : Patient presents for routine med refills. Follows with hem/onc for prostate cancer. Currently undergoing treatment.  Was previously on prednisone, but DC'd back in November due to side effects.  Compliant with medications. Follows with pain Managment regarding low back pain, unable to had injections due to elevated BG.

## 2024-04-02 NOTE — PLAN
[FreeTextEntry1] : DM2- Repeat A1C ordered. Continue Glipizide ER10mg, Metformin 1000mg BID, Actos 30mg, trulicity 3mg. Medications reviewed and renewed.  Low back pain/muscle spasm- tizanidine 2mg Q6 hrs PRN back pain.  follow up in 3 months.

## 2024-04-02 NOTE — HEALTH RISK ASSESSMENT
[0] : 2) Feeling down, depressed, or hopeless: Not at all (0) [PHQ-2 Negative - No further assessment needed] : PHQ-2 Negative - No further assessment needed [[id number]] : 0

## 2024-04-03 LAB
ESTIMATED AVERAGE GLUCOSE: 146 MG/DL
HBA1C MFR BLD HPLC: 6.7 %

## 2024-04-04 ENCOUNTER — LABORATORY RESULT (OUTPATIENT)
Age: 69
End: 2024-04-04

## 2024-04-04 ENCOUNTER — APPOINTMENT (OUTPATIENT)
Dept: HEMATOLOGY ONCOLOGY | Facility: CLINIC | Age: 69
End: 2024-04-04
Payer: MEDICARE

## 2024-04-04 VITALS
HEIGHT: 65 IN | BODY MASS INDEX: 45.32 KG/M2 | OXYGEN SATURATION: 99 % | HEART RATE: 89 BPM | TEMPERATURE: 97.6 F | SYSTOLIC BLOOD PRESSURE: 152 MMHG | WEIGHT: 272 LBS | DIASTOLIC BLOOD PRESSURE: 82 MMHG

## 2024-04-04 PROCEDURE — 99214 OFFICE O/P EST MOD 30 MIN: CPT

## 2024-04-04 PROCEDURE — 85027 COMPLETE CBC AUTOMATED: CPT

## 2024-04-04 PROCEDURE — G2211 COMPLEX E/M VISIT ADD ON: CPT

## 2024-04-04 NOTE — RESULTS/DATA
[FreeTextEntry1] : Mr. Washington presented at age 66 in May 2022 for evaluation of locally recurrent prostate adenocarcinoma.  The patient has a medical history of HTN, DM.  S/p salvage RT for locally recurrent prostate adenocarcinoma with Dr. Caballero.  Initiated abiraterone 8/2022, switched to Xtandi in November 2023 due to hyperglycemia. Tolerating this well.  Plan for 2-3 years of treatment (favor 3 years).  Continue lupron injections with Dr. Olguin.  Dysuria and consipation resolved.  Reviewed testicular US w/ Dr. Olguin.  RTC 3 months. Trend PSA.  All patient questions answered at today's visit. Patient urged to call the office with any questions or concerns.   I personally have spent a total of 35 minutes of time on the date of this encounter reviewing test results, documenting findings, coordinating care and directly consulting with the patient and/or designated family member. Greater than 50% of the face to face encounter time was spent on counseling and/or coordination of care for recurrent prostate cancer.

## 2024-04-04 NOTE — HISTORY OF PRESENT ILLNESS
[de-identified] : Referred by: Dr. Linda Caballero Diagnosis: Locally recurrent prostate cancer  Mr. Washington presented at age 66 in May 2022 for evaluation of locally recurrent prostate adenocarcinoma.  The patient has a medical history of HTN, DM.  Presenting HPI: Gildardo is referred for consultation for locally recurrent prostate cancer. He has a history of prostate cancer and initially underwent radical prostatectomy by his urologist about 15 years ago, denies any adjuvant therapy at that time.  He was subsequently lost to follow-up and recent PSA testing by his primary care physician showed an elevated PSA to 11. He underwent an MRI of the prostate which showed likely prostate bed recurrence of the prostatectomy bed.  There was a visible abnormality measuring 21 x 15 mm that involve the bladder neck and encases the proximal urethra.  He underwent biopsy of the lesion which confirmed Laura 4+3 carcinoma. PSMA PET CT was performed on 4/28/22 which initially was read as negative however upon further review showed focal PSMA uptake in the prostate bed as well as bilateral breast uptake likely correlating with gynecomastia.  He has now been initiated on bicalutamide and received his first lupron injection by urologist Dr. Olguin on Thursday 5/5/22. He is pending salvage radiation treatment with Dr. Caballero and has been referred to medical oncology for consideration of additional systemic therapies.  At today's visit he reports that he is feeling generally very well.  He denies any urinary complaints at this time.  He has a good appetite and his weight is stable.  He also reports a good energy level.  He is here today with 2 of his sisters who he is very close with.  PSA 4/8/22: 11.32 PSA 11/19/21: 11   Imaging: PSMA PET CT 4/28/22: There is subtle focal PSMA uptake in the prostate surgical resection site in the right posterior lateral region adjacent to the ureter with an SUV max 11.6, 1.1 x 0.8 cm.  It corresponds to the 2.1 x 1.5 cm T2 hypointense lesion described on most recent prostate MRI.  It is adjacent to the distended urinary bladder making it difficult to distinguish from the overlying PSMA avid urine.  No evidence of PSMA avid adenopathy in the pelvis abdomen or above the diaphragm to suggest edinson mets.  There is some focal increased PSMA uptake in the left breast greater than right breast.  No evidence of PSMA avid metastatic disease in the bones.  MRI prostate 3/3/22 - 2.1 x 1.5cm PI-RADS 5 lesion wrapping around the proximal urethra, anterior R lateral posterior with some tumor extending posterior superiorly behind the bladder neck and R seminal vesicle  Path:  Prostate lesion biopsy 3/28/22: Adenocarcinoma with extension into the muscle, laura 4+3=7, grade group 3, 80% involvement, measuring 12mm, PNI+ Core path reviewed (8/3/22) Prostate adenocarcinoma, grade group 5 (laura 4+5-9), PNI+, Initial path revealed laura 4+3=7  Genetics: not done   HCM:  - COVID vaccination: s/p 2 doses  - Colonoscopy: never done   SH:  - Occupation: on disability  - Living situation: lives in [place], does not have any children, he has a strong family support system with several sisters   - Smoking/etoh/illicits: never smoker, drinks at holidays, denies ilicits  - Exercise: minimally active   FH:  - His sister had breast cancer around age 60 [de-identified] : Gildardo presents for follow up on 4/4/24 for prostate cancer.  Remains on lupron q 3 months Initiate abiraterone/prednisone 8/2022 - plan for 2-3 years therapy   At today's visit he is feeling very well. Switched to Xtandi ~6 months ago because of hyperglycemia. Lost 10lb after stopping the prednisone. Reports frequent urination, wakes 6-7 times overnight to urinate. He denies hot flashes or fatigue. Remains on lupron q 3 months which he receives w/ his urologist Dr. Olguin, last dose 3/2024.  He has continued back pain - injured his back ~5 years ago. Had to stop steroid injections 2/2 hyperglycemia. Recently started a muscle relaxant.  PSA remains undetectable <0.01 (1/4/24)

## 2024-04-04 NOTE — PHYSICAL EXAM
[Restricted in physically strenuous activity but ambulatory and able to carry out work of a light or sedentary nature] : Status 1- Restricted in physically strenuous activity but ambulatory and able to carry out work of a light or sedentary nature, e.g., light house work, office work [Normal] : affect appropriate [de-identified] : generally well appearing male, NAD, pleasant

## 2024-04-11 LAB
ALBUMIN SERPL ELPH-MCNC: 4.5 G/DL
ALP BLD-CCNC: 55 U/L
ALT SERPL-CCNC: 25 U/L
ANION GAP SERPL CALC-SCNC: 17 MMOL/L
AST SERPL-CCNC: 22 U/L
BILIRUB SERPL-MCNC: 0.4 MG/DL
BUN SERPL-MCNC: 13 MG/DL
CALCIUM SERPL-MCNC: 9.8 MG/DL
CHLORIDE SERPL-SCNC: 96 MMOL/L
CO2 SERPL-SCNC: 20 MMOL/L
CREAT SERPL-MCNC: 0.83 MG/DL
EGFR: 97 ML/MIN/1.73M2
GLUCOSE SERPL-MCNC: 205 MG/DL
HCT VFR BLD CALC: 32.9 %
HGB BLD-MCNC: 10.9 G/DL
MCHC RBC-ENTMCNC: 30.6 PG
MCHC RBC-ENTMCNC: 33.1 GM/DL
MCV RBC AUTO: 92.4 FL
PLATELET # BLD AUTO: 212 K/UL
POTASSIUM SERPL-SCNC: 4.2 MMOL/L
PROT SERPL-MCNC: 7.1 G/DL
PSA FREE FLD-MCNC: NORMAL %
PSA FREE SERPL-MCNC: <0.01 NG/ML
PSA SERPL-MCNC: <0.01 NG/ML
RBC # BLD: 3.56 M/UL
RBC # FLD: 12.5 %
SODIUM SERPL-SCNC: 133 MMOL/L
WBC # FLD AUTO: 3.53 K/UL

## 2024-05-22 RX ORDER — ENZALUTAMIDE 40 MG/1
40 CAPSULE ORAL DAILY
Qty: 120 | Refills: 10 | Status: ACTIVE | COMMUNITY
Start: 2023-11-20 | End: 1900-01-01

## 2024-06-25 ENCOUNTER — OUTPATIENT (OUTPATIENT)
Dept: OUTPATIENT SERVICES | Facility: HOSPITAL | Age: 69
LOS: 1 days | End: 2024-06-25

## 2024-06-25 DIAGNOSIS — C61 MALIGNANT NEOPLASM OF PROSTATE: ICD-10-CM

## 2024-06-28 ENCOUNTER — RX RENEWAL (OUTPATIENT)
Age: 69
End: 2024-06-28

## 2024-07-01 ENCOUNTER — APPOINTMENT (OUTPATIENT)
Dept: HEMATOLOGY ONCOLOGY | Facility: CLINIC | Age: 69
End: 2024-07-01

## 2024-07-02 ENCOUNTER — APPOINTMENT (OUTPATIENT)
Dept: FAMILY MEDICINE | Facility: CLINIC | Age: 69
End: 2024-07-02
Payer: MEDICARE

## 2024-07-02 VITALS
SYSTOLIC BLOOD PRESSURE: 160 MMHG | HEIGHT: 65 IN | HEART RATE: 93 BPM | RESPIRATION RATE: 16 BRPM | OXYGEN SATURATION: 98 % | WEIGHT: 279 LBS | DIASTOLIC BLOOD PRESSURE: 84 MMHG | BODY MASS INDEX: 46.48 KG/M2

## 2024-07-02 VITALS — SYSTOLIC BLOOD PRESSURE: 134 MMHG | DIASTOLIC BLOOD PRESSURE: 78 MMHG

## 2024-07-02 DIAGNOSIS — C61 MALIGNANT NEOPLASM OF PROSTATE: ICD-10-CM

## 2024-07-02 DIAGNOSIS — I10 ESSENTIAL (PRIMARY) HYPERTENSION: ICD-10-CM

## 2024-07-02 DIAGNOSIS — E78.5 HYPERLIPIDEMIA, UNSPECIFIED: ICD-10-CM

## 2024-07-02 DIAGNOSIS — R79.89 OTHER SPECIFIED ABNORMAL FINDINGS OF BLOOD CHEMISTRY: ICD-10-CM

## 2024-07-02 DIAGNOSIS — Z13.21 ENCOUNTER FOR SCREENING FOR NUTRITIONAL DISORDER: ICD-10-CM

## 2024-07-02 DIAGNOSIS — D64.9 ANEMIA, UNSPECIFIED: ICD-10-CM

## 2024-07-02 DIAGNOSIS — E53.8 DEFICIENCY OF OTHER SPECIFIED B GROUP VITAMINS: ICD-10-CM

## 2024-07-02 DIAGNOSIS — E88.810 METABOLIC SYNDROME: ICD-10-CM

## 2024-07-02 PROCEDURE — 36415 COLL VENOUS BLD VENIPUNCTURE: CPT

## 2024-07-02 PROCEDURE — 99214 OFFICE O/P EST MOD 30 MIN: CPT

## 2024-07-09 LAB
25(OH)D3 SERPL-MCNC: 48.9 NG/ML
ALBUMIN SERPL ELPH-MCNC: 4.5 G/DL
ALP BLD-CCNC: 51 U/L
ALT SERPL-CCNC: 27 U/L
ANION GAP SERPL CALC-SCNC: 17 MMOL/L
AST SERPL-CCNC: 22 U/L
BILIRUB SERPL-MCNC: 0.3 MG/DL
BUN SERPL-MCNC: 19 MG/DL
CALCIUM SERPL-MCNC: 9.7 MG/DL
CHLORIDE SERPL-SCNC: 96 MMOL/L
CHOLEST SERPL-MCNC: 194 MG/DL
CO2 SERPL-SCNC: 20 MMOL/L
CREAT SERPL-MCNC: 0.9 MG/DL
EGFR: 94 ML/MIN/1.73M2
ESTIMATED AVERAGE GLUCOSE: 131 MG/DL
GLUCOSE SERPL-MCNC: 176 MG/DL
HBA1C MFR BLD HPLC: 6.2 %
HDLC SERPL-MCNC: 73 MG/DL
LDLC SERPL CALC-MCNC: 110 MG/DL
NONHDLC SERPL-MCNC: 121 MG/DL
POTASSIUM SERPL-SCNC: 4.3 MMOL/L
PROT SERPL-MCNC: 7.3 G/DL
SODIUM SERPL-SCNC: 133 MMOL/L
TRIGL SERPL-MCNC: 60 MG/DL
TSH SERPL-ACNC: 1.49 UIU/ML

## 2024-07-29 ENCOUNTER — RX RENEWAL (OUTPATIENT)
Age: 69
End: 2024-07-29

## 2024-09-26 ENCOUNTER — RX RENEWAL (OUTPATIENT)
Age: 69
End: 2024-09-26

## 2024-09-30 ENCOUNTER — OUTPATIENT (OUTPATIENT)
Dept: OUTPATIENT SERVICES | Facility: HOSPITAL | Age: 69
LOS: 1 days | End: 2024-09-30

## 2024-09-30 ENCOUNTER — RX RENEWAL (OUTPATIENT)
Age: 69
End: 2024-09-30

## 2024-09-30 DIAGNOSIS — C61 MALIGNANT NEOPLASM OF PROSTATE: ICD-10-CM

## 2024-10-02 DIAGNOSIS — C61 MALIGNANT NEOPLASM OF PROSTATE: ICD-10-CM

## 2024-10-07 ENCOUNTER — RESULT REVIEW (OUTPATIENT)
Age: 69
End: 2024-10-07

## 2024-10-07 ENCOUNTER — APPOINTMENT (OUTPATIENT)
Dept: HEMATOLOGY ONCOLOGY | Facility: CLINIC | Age: 69
End: 2024-10-07
Payer: MEDICARE

## 2024-10-07 VITALS
TEMPERATURE: 97.6 F | OXYGEN SATURATION: 98 % | HEART RATE: 98 BPM | DIASTOLIC BLOOD PRESSURE: 108 MMHG | SYSTOLIC BLOOD PRESSURE: 195 MMHG

## 2024-10-07 DIAGNOSIS — C61 MALIGNANT NEOPLASM OF PROSTATE: ICD-10-CM

## 2024-10-07 LAB
BASOPHILS # BLD AUTO: 0.01 K/UL — SIGNIFICANT CHANGE UP (ref 0–0.2)
BASOPHILS NFR BLD AUTO: 0.2 % — SIGNIFICANT CHANGE UP (ref 0–2)
EOSINOPHIL # BLD AUTO: 0.11 K/UL — SIGNIFICANT CHANGE UP (ref 0–0.5)
EOSINOPHIL NFR BLD AUTO: 2.7 % — SIGNIFICANT CHANGE UP (ref 0–6)
HCT VFR BLD CALC: 30.9 % — LOW (ref 39–50)
HGB BLD-MCNC: 10.3 G/DL — LOW (ref 13–17)
IMM GRANULOCYTES NFR BLD AUTO: 1.7 % — HIGH (ref 0–0.9)
LYMPHOCYTES # BLD AUTO: 0.88 K/UL — LOW (ref 1–3.3)
LYMPHOCYTES # BLD AUTO: 21.8 % — SIGNIFICANT CHANGE UP (ref 13–44)
MCHC RBC-ENTMCNC: 31.2 PG — SIGNIFICANT CHANGE UP (ref 27–34)
MCHC RBC-ENTMCNC: 33.3 GM/DL — SIGNIFICANT CHANGE UP (ref 32–36)
MCV RBC AUTO: 93.6 FL — SIGNIFICANT CHANGE UP (ref 80–100)
MONOCYTES # BLD AUTO: 0.43 K/UL — SIGNIFICANT CHANGE UP (ref 0–0.9)
MONOCYTES NFR BLD AUTO: 10.6 % — SIGNIFICANT CHANGE UP (ref 2–14)
NEUTROPHILS # BLD AUTO: 2.54 K/UL — SIGNIFICANT CHANGE UP (ref 1.8–7.4)
NEUTROPHILS NFR BLD AUTO: 63 % — SIGNIFICANT CHANGE UP (ref 43–77)
NRBC # BLD: 0 /100 WBCS — SIGNIFICANT CHANGE UP (ref 0–0)
PLATELET # BLD AUTO: 203 K/UL — SIGNIFICANT CHANGE UP (ref 150–400)
RBC # BLD: 3.3 M/UL — LOW (ref 4.2–5.8)
RBC # FLD: 12.5 % — SIGNIFICANT CHANGE UP (ref 10.3–14.5)
WBC # BLD: 4.04 K/UL — SIGNIFICANT CHANGE UP (ref 3.8–10.5)
WBC # FLD AUTO: 4.04 K/UL — SIGNIFICANT CHANGE UP (ref 3.8–10.5)

## 2024-10-07 PROCEDURE — G2211 COMPLEX E/M VISIT ADD ON: CPT

## 2024-10-07 PROCEDURE — 99213 OFFICE O/P EST LOW 20 MIN: CPT

## 2024-10-09 LAB
ALBUMIN SERPL ELPH-MCNC: 4.2 G/DL
ALP BLD-CCNC: 47 U/L
ALT SERPL-CCNC: 14 U/L
ANION GAP SERPL CALC-SCNC: 17 MMOL/L
AST SERPL-CCNC: 13 U/L
BILIRUB SERPL-MCNC: 0.3 MG/DL
BUN SERPL-MCNC: 14 MG/DL
CALCIUM SERPL-MCNC: 10 MG/DL
CHLORIDE SERPL-SCNC: 97 MMOL/L
CO2 SERPL-SCNC: 21 MMOL/L
CREAT SERPL-MCNC: 0.88 MG/DL
EGFR: 94 ML/MIN/1.73M2
GLUCOSE SERPL-MCNC: 208 MG/DL
POTASSIUM SERPL-SCNC: 4.3 MMOL/L
PROT SERPL-MCNC: 7.2 G/DL
PSA FREE FLD-MCNC: NORMAL %
PSA FREE SERPL-MCNC: <0.01 NG/ML
PSA SERPL-MCNC: <0.01 NG/ML
SODIUM SERPL-SCNC: 135 MMOL/L
TESTOST FREE SERPL-MCNC: 2.6 PG/ML
TESTOST SERPL-MCNC: <2.5 NG/DL

## 2024-10-15 ENCOUNTER — APPOINTMENT (OUTPATIENT)
Dept: FAMILY MEDICINE | Facility: CLINIC | Age: 69
End: 2024-10-15
Payer: MEDICARE

## 2024-10-15 VITALS
HEART RATE: 91 BPM | SYSTOLIC BLOOD PRESSURE: 156 MMHG | WEIGHT: 288 LBS | OXYGEN SATURATION: 97 % | HEIGHT: 65 IN | TEMPERATURE: 97.2 F | DIASTOLIC BLOOD PRESSURE: 96 MMHG | BODY MASS INDEX: 47.98 KG/M2

## 2024-10-15 DIAGNOSIS — D64.9 ANEMIA, UNSPECIFIED: ICD-10-CM

## 2024-10-15 DIAGNOSIS — M47.816 SPONDYLOSIS W/OUT MYELOPATHY OR RADICULOPATHY, LUMBAR REGION: ICD-10-CM

## 2024-10-15 DIAGNOSIS — I10 ESSENTIAL (PRIMARY) HYPERTENSION: ICD-10-CM

## 2024-10-15 DIAGNOSIS — R79.89 OTHER SPECIFIED ABNORMAL FINDINGS OF BLOOD CHEMISTRY: ICD-10-CM

## 2024-10-15 DIAGNOSIS — E11.9 TYPE 2 DIABETES MELLITUS W/OUT COMPLICATIONS: ICD-10-CM

## 2024-10-15 DIAGNOSIS — E53.8 DEFICIENCY OF OTHER SPECIFIED B GROUP VITAMINS: ICD-10-CM

## 2024-10-15 DIAGNOSIS — Z13.21 ENCOUNTER FOR SCREENING FOR NUTRITIONAL DISORDER: ICD-10-CM

## 2024-10-15 PROCEDURE — 36415 COLL VENOUS BLD VENIPUNCTURE: CPT

## 2024-10-15 PROCEDURE — 99214 OFFICE O/P EST MOD 30 MIN: CPT

## 2024-10-15 RX ORDER — IBUPROFEN 800 MG/1
800 TABLET ORAL
Qty: 20 | Refills: 1 | Status: ACTIVE | COMMUNITY
Start: 2024-10-15 | End: 1900-01-01

## 2024-10-15 RX ORDER — DULAGLUTIDE 3 MG/.5ML
3 INJECTION, SOLUTION SUBCUTANEOUS WEEKLY
Qty: 4 | Refills: 1 | Status: ACTIVE | COMMUNITY
Start: 2024-10-15 | End: 1900-01-01

## 2024-10-22 LAB
25(OH)D3 SERPL-MCNC: 40.1 NG/ML
ALBUMIN SERPL ELPH-MCNC: 4.3 G/DL
ALP BLD-CCNC: 44 U/L
ALT SERPL-CCNC: 16 U/L
ANION GAP SERPL CALC-SCNC: 16 MMOL/L
AST SERPL-CCNC: 13 U/L
BILIRUB SERPL-MCNC: 0.4 MG/DL
BUN SERPL-MCNC: 21 MG/DL
CALCIUM SERPL-MCNC: 9.7 MG/DL
CHLORIDE SERPL-SCNC: 97 MMOL/L
CHOLEST SERPL-MCNC: 192 MG/DL
CO2 SERPL-SCNC: 21 MMOL/L
CREAT SERPL-MCNC: 0.89 MG/DL
EGFR: 94 ML/MIN/1.73M2
ESTIMATED AVERAGE GLUCOSE: 120 MG/DL
GLUCOSE SERPL-MCNC: 222 MG/DL
HBA1C MFR BLD HPLC: 5.8 %
HDLC SERPL-MCNC: 72 MG/DL
LDLC SERPL CALC-MCNC: 107 MG/DL
NONHDLC SERPL-MCNC: 119 MG/DL
POTASSIUM SERPL-SCNC: 4.5 MMOL/L
PROT SERPL-MCNC: 7.2 G/DL
SODIUM SERPL-SCNC: 134 MMOL/L
TRIGL SERPL-MCNC: 64 MG/DL
TSH SERPL-ACNC: 1.49 UIU/ML

## 2024-12-27 ENCOUNTER — RX RENEWAL (OUTPATIENT)
Age: 69
End: 2024-12-27

## 2024-12-30 ENCOUNTER — RX RENEWAL (OUTPATIENT)
Age: 69
End: 2024-12-30

## 2024-12-31 ENCOUNTER — RX RENEWAL (OUTPATIENT)
Age: 69
End: 2024-12-31

## 2025-01-08 ENCOUNTER — RX RENEWAL (OUTPATIENT)
Age: 70
End: 2025-01-08

## 2025-01-17 ENCOUNTER — RX RENEWAL (OUTPATIENT)
Age: 70
End: 2025-01-17

## 2025-02-13 ENCOUNTER — APPOINTMENT (OUTPATIENT)
Dept: HEMATOLOGY ONCOLOGY | Facility: CLINIC | Age: 70
End: 2025-02-13

## 2025-02-13 ENCOUNTER — RESULT REVIEW (OUTPATIENT)
Age: 70
End: 2025-02-13

## 2025-02-13 ENCOUNTER — APPOINTMENT (OUTPATIENT)
Dept: HEMATOLOGY ONCOLOGY | Facility: CLINIC | Age: 70
End: 2025-02-13
Payer: MEDICARE

## 2025-02-13 VITALS
HEART RATE: 93 BPM | DIASTOLIC BLOOD PRESSURE: 101 MMHG | SYSTOLIC BLOOD PRESSURE: 174 MMHG | BODY MASS INDEX: 47.65 KG/M2 | TEMPERATURE: 98.1 F | WEIGHT: 286 LBS | HEIGHT: 65 IN | OXYGEN SATURATION: 98 %

## 2025-02-13 DIAGNOSIS — C61 MALIGNANT NEOPLASM OF PROSTATE: ICD-10-CM

## 2025-02-13 LAB
ALBUMIN SERPL ELPH-MCNC: 4.3 G/DL
ALP BLD-CCNC: 51 U/L
ALT SERPL-CCNC: 23 U/L
ANION GAP SERPL CALC-SCNC: 17 MMOL/L
AST SERPL-CCNC: 19 U/L
BILIRUB SERPL-MCNC: 0.4 MG/DL
BUN SERPL-MCNC: 13 MG/DL
CALCIUM SERPL-MCNC: 9.8 MG/DL
CHLORIDE SERPL-SCNC: 96 MMOL/L
CO2 SERPL-SCNC: 20 MMOL/L
CREAT SERPL-MCNC: 0.75 MG/DL
EGFR: 99 ML/MIN/1.73M2
GLUCOSE SERPL-MCNC: 196 MG/DL
POTASSIUM SERPL-SCNC: 4.5 MMOL/L
PROT SERPL-MCNC: 7.3 G/DL
PSA FREE FLD-MCNC: NORMAL %
PSA FREE SERPL-MCNC: <0.01 NG/ML
PSA SERPL-MCNC: <0.01 NG/ML
SODIUM SERPL-SCNC: 133 MMOL/L

## 2025-02-13 PROCEDURE — 85027 COMPLETE CBC AUTOMATED: CPT

## 2025-02-13 PROCEDURE — 99214 OFFICE O/P EST MOD 30 MIN: CPT

## 2025-02-13 PROCEDURE — G2211 COMPLEX E/M VISIT ADD ON: CPT

## 2025-02-18 LAB
TESTOST FREE SERPL-MCNC: 1.1 PG/ML
TESTOST SERPL-MCNC: 7.1 NG/DL

## 2025-02-24 ENCOUNTER — RX RENEWAL (OUTPATIENT)
Age: 70
End: 2025-02-24

## 2025-02-28 ENCOUNTER — RX RENEWAL (OUTPATIENT)
Age: 70
End: 2025-02-28

## 2025-03-28 ENCOUNTER — RX RENEWAL (OUTPATIENT)
Age: 70
End: 2025-03-28

## 2025-04-08 ENCOUNTER — APPOINTMENT (OUTPATIENT)
Dept: FAMILY MEDICINE | Facility: CLINIC | Age: 70
End: 2025-04-08
Payer: MEDICARE

## 2025-04-08 ENCOUNTER — RESULT CHARGE (OUTPATIENT)
Age: 70
End: 2025-04-08

## 2025-04-08 VITALS
WEIGHT: 280 LBS | DIASTOLIC BLOOD PRESSURE: 92 MMHG | TEMPERATURE: 97.5 F | HEIGHT: 65 IN | HEART RATE: 95 BPM | SYSTOLIC BLOOD PRESSURE: 142 MMHG | BODY MASS INDEX: 46.65 KG/M2 | RESPIRATION RATE: 16 BRPM | OXYGEN SATURATION: 98 %

## 2025-04-08 DIAGNOSIS — C61 MALIGNANT NEOPLASM OF PROSTATE: ICD-10-CM

## 2025-04-08 DIAGNOSIS — E78.5 HYPERLIPIDEMIA, UNSPECIFIED: ICD-10-CM

## 2025-04-08 DIAGNOSIS — K21.9 GASTRO-ESOPHAGEAL REFLUX DISEASE W/OUT ESOPHAGITIS: ICD-10-CM

## 2025-04-08 DIAGNOSIS — Z13.29 ENCOUNTER FOR SCREENING FOR OTHER SUSPECTED ENDOCRINE DISORDER: ICD-10-CM

## 2025-04-08 DIAGNOSIS — D64.9 ANEMIA, UNSPECIFIED: ICD-10-CM

## 2025-04-08 DIAGNOSIS — M54.9 DORSALGIA, UNSPECIFIED: ICD-10-CM

## 2025-04-08 DIAGNOSIS — I10 ESSENTIAL (PRIMARY) HYPERTENSION: ICD-10-CM

## 2025-04-08 DIAGNOSIS — E88.810 METABOLIC SYNDROME: ICD-10-CM

## 2025-04-08 DIAGNOSIS — E53.8 DEFICIENCY OF OTHER SPECIFIED B GROUP VITAMINS: ICD-10-CM

## 2025-04-08 DIAGNOSIS — M47.816 SPONDYLOSIS W/OUT MYELOPATHY OR RADICULOPATHY, LUMBAR REGION: ICD-10-CM

## 2025-04-08 DIAGNOSIS — R79.89 OTHER SPECIFIED ABNORMAL FINDINGS OF BLOOD CHEMISTRY: ICD-10-CM

## 2025-04-08 LAB — HBA1C MFR BLD HPLC: 6.2

## 2025-04-08 PROCEDURE — 99214 OFFICE O/P EST MOD 30 MIN: CPT

## 2025-04-08 PROCEDURE — 36415 COLL VENOUS BLD VENIPUNCTURE: CPT

## 2025-04-08 PROCEDURE — 83036 HEMOGLOBIN GLYCOSYLATED A1C: CPT | Mod: QW

## 2025-04-08 RX ORDER — TIRZEPATIDE 2.5 MG/.5ML
2.5 INJECTION, SOLUTION SUBCUTANEOUS WEEKLY
Qty: 3 | Refills: 0 | Status: ACTIVE | COMMUNITY
Start: 2025-04-08 | End: 1900-01-01

## 2025-04-09 LAB
ALBUMIN SERPL ELPH-MCNC: 4.3 G/DL
ALP BLD-CCNC: 53 U/L
ALT SERPL-CCNC: 22 U/L
ANION GAP SERPL CALC-SCNC: 16 MMOL/L
AST SERPL-CCNC: 20 U/L
BILIRUB SERPL-MCNC: 0.3 MG/DL
BUN SERPL-MCNC: 15 MG/DL
CALCIUM SERPL-MCNC: 9.8 MG/DL
CHLORIDE SERPL-SCNC: 96 MMOL/L
CO2 SERPL-SCNC: 19 MMOL/L
CREAT SERPL-MCNC: 0.79 MG/DL
EGFRCR SERPLBLD CKD-EPI 2021: 97 ML/MIN/1.73M2
GLUCOSE SERPL-MCNC: 229 MG/DL
POTASSIUM SERPL-SCNC: 4.5 MMOL/L
PROT SERPL-MCNC: 7.2 G/DL
SODIUM SERPL-SCNC: 131 MMOL/L

## 2025-04-11 LAB
25(OH)D3 SERPL-MCNC: 42.2 NG/ML
BASOPHILS # BLD AUTO: 0.02 K/UL
BASOPHILS NFR BLD AUTO: 0.5 %
CHOLEST SERPL-MCNC: 189 MG/DL
EOSINOPHIL # BLD AUTO: 0.07 K/UL
EOSINOPHIL NFR BLD AUTO: 1.6 %
ESTIMATED AVERAGE GLUCOSE: 137 MG/DL
HBA1C MFR BLD HPLC: 6.4 %
HCT VFR BLD CALC: 31.4 %
HDLC SERPL-MCNC: 67 MG/DL
HGB BLD-MCNC: 10 G/DL
IMM GRANULOCYTES NFR BLD AUTO: 0.9 %
LDLC SERPL-MCNC: 111 MG/DL
LYMPHOCYTES # BLD AUTO: 0.75 K/UL
LYMPHOCYTES NFR BLD AUTO: 17.1 %
MAN DIFF?: NORMAL
MCHC RBC-ENTMCNC: 29.6 PG
MCHC RBC-ENTMCNC: 31.8 G/DL
MCV RBC AUTO: 92.9 FL
MONOCYTES # BLD AUTO: 0.5 K/UL
MONOCYTES NFR BLD AUTO: 11.4 %
NEUTROPHILS # BLD AUTO: 3.01 K/UL
NEUTROPHILS NFR BLD AUTO: 68.5 %
NONHDLC SERPL-MCNC: 122 MG/DL
PLATELET # BLD AUTO: 177 K/UL
RBC # BLD: 3.38 M/UL
RBC # FLD: 13.3 %
TRIGL SERPL-MCNC: 62 MG/DL
TSH SERPL-ACNC: 1.51 UIU/ML
WBC # FLD AUTO: 4.39 K/UL

## 2025-04-15 ENCOUNTER — RX RENEWAL (OUTPATIENT)
Age: 70
End: 2025-04-15

## 2025-05-13 ENCOUNTER — RX RENEWAL (OUTPATIENT)
Age: 70
End: 2025-05-13

## 2025-05-13 DIAGNOSIS — C61 MALIGNANT NEOPLASM OF PROSTATE: ICD-10-CM

## 2025-05-13 RX ORDER — TAMSULOSIN HYDROCHLORIDE 0.4 MG/1
0.4 CAPSULE ORAL
Qty: 90 | Refills: 0 | Status: ACTIVE | COMMUNITY
Start: 2025-05-13 | End: 1900-01-01

## 2025-06-27 ENCOUNTER — RX RENEWAL (OUTPATIENT)
Age: 70
End: 2025-06-27

## 2025-07-03 ENCOUNTER — APPOINTMENT (OUTPATIENT)
Dept: HEMATOLOGY ONCOLOGY | Facility: CLINIC | Age: 70
End: 2025-07-03
Payer: MEDICARE

## 2025-07-03 ENCOUNTER — LABORATORY RESULT (OUTPATIENT)
Age: 70
End: 2025-07-03

## 2025-07-03 ENCOUNTER — RESULT REVIEW (OUTPATIENT)
Age: 70
End: 2025-07-03

## 2025-07-03 VITALS
SYSTOLIC BLOOD PRESSURE: 138 MMHG | DIASTOLIC BLOOD PRESSURE: 84 MMHG | TEMPERATURE: 97.6 F | OXYGEN SATURATION: 100 % | HEART RATE: 92 BPM | WEIGHT: 220 LBS | HEIGHT: 65 IN | BODY MASS INDEX: 36.65 KG/M2

## 2025-07-03 PROCEDURE — 99213 OFFICE O/P EST LOW 20 MIN: CPT

## 2025-07-03 PROCEDURE — G2211 COMPLEX E/M VISIT ADD ON: CPT

## 2025-07-05 ENCOUNTER — OUTPATIENT (OUTPATIENT)
Dept: OUTPATIENT SERVICES | Facility: HOSPITAL | Age: 70
LOS: 1 days | End: 2025-07-05

## 2025-07-05 DIAGNOSIS — C18.9 MALIGNANT NEOPLASM OF COLON, UNSPECIFIED: ICD-10-CM

## 2025-07-05 PROCEDURE — 85025 COMPLETE CBC W/AUTO DIFF WBC: CPT

## 2025-07-07 ENCOUNTER — APPOINTMENT (OUTPATIENT)
Dept: HEMATOLOGY ONCOLOGY | Facility: CLINIC | Age: 70
End: 2025-07-07

## 2025-07-07 LAB
ALBUMIN SERPL ELPH-MCNC: 4.2 G/DL
ALP BLD-CCNC: 46 U/L
ALT SERPL-CCNC: 20 U/L
ANION GAP SERPL CALC-SCNC: 17 MMOL/L
AST SERPL-CCNC: 21 U/L
BILIRUB SERPL-MCNC: 0.4 MG/DL
BUN SERPL-MCNC: 15 MG/DL
CALCIUM SERPL-MCNC: 9.5 MG/DL
CHLORIDE SERPL-SCNC: 102 MMOL/L
CO2 SERPL-SCNC: 16 MMOL/L
CREAT SERPL-MCNC: 0.9 MG/DL
EGFRCR SERPLBLD CKD-EPI 2021: 94 ML/MIN/1.73M2
GLUCOSE SERPL-MCNC: 165 MG/DL
POTASSIUM SERPL-SCNC: 4.3 MMOL/L
PROT SERPL-MCNC: 7 G/DL
PSA FREE FLD-MCNC: NORMAL %
PSA FREE SERPL-MCNC: <0.01 NG/ML
PSA SERPL-MCNC: <0.01 NG/ML
SODIUM SERPL-SCNC: 136 MMOL/L

## 2025-07-11 ENCOUNTER — APPOINTMENT (OUTPATIENT)
Facility: CLINIC | Age: 70
End: 2025-07-11
Payer: MEDICARE

## 2025-07-11 VITALS
RESPIRATION RATE: 17 BRPM | DIASTOLIC BLOOD PRESSURE: 87 MMHG | HEIGHT: 65 IN | BODY MASS INDEX: 47.65 KG/M2 | TEMPERATURE: 97.9 F | WEIGHT: 286 LBS | OXYGEN SATURATION: 98 % | HEART RATE: 89 BPM | SYSTOLIC BLOOD PRESSURE: 160 MMHG

## 2025-07-11 PROCEDURE — 99215 OFFICE O/P EST HI 40 MIN: CPT

## 2025-07-15 ENCOUNTER — APPOINTMENT (OUTPATIENT)
Dept: FAMILY MEDICINE | Facility: CLINIC | Age: 70
End: 2025-07-15
Payer: MEDICARE

## 2025-07-15 VITALS
RESPIRATION RATE: 16 BRPM | WEIGHT: 281 LBS | HEIGHT: 65 IN | OXYGEN SATURATION: 99 % | SYSTOLIC BLOOD PRESSURE: 136 MMHG | DIASTOLIC BLOOD PRESSURE: 70 MMHG | BODY MASS INDEX: 46.82 KG/M2 | HEART RATE: 92 BPM | TEMPERATURE: 98 F

## 2025-07-15 LAB — HBA1C MFR BLD HPLC: 5.5

## 2025-07-15 PROCEDURE — 83036 HEMOGLOBIN GLYCOSYLATED A1C: CPT | Mod: QW

## 2025-07-15 PROCEDURE — 99214 OFFICE O/P EST MOD 30 MIN: CPT

## 2025-07-16 ENCOUNTER — RX RENEWAL (OUTPATIENT)
Age: 70
End: 2025-07-16

## 2025-07-17 NOTE — ASSESSMENT
Addended by: JB SENA on: 7/17/2025 12:09 PM     Modules accepted: Orders     [FreeTextEntry1] : DMll\par HTN\par Hyperlipid\par Metobolic syndrome\par Prostrate Cancer\par Vitamin D Def

## 2025-08-11 ENCOUNTER — RX RENEWAL (OUTPATIENT)
Age: 70
End: 2025-08-11